# Patient Record
Sex: FEMALE | Race: WHITE | NOT HISPANIC OR LATINO | Employment: OTHER | URBAN - NONMETROPOLITAN AREA
[De-identification: names, ages, dates, MRNs, and addresses within clinical notes are randomized per-mention and may not be internally consistent; named-entity substitution may affect disease eponyms.]

---

## 2017-03-10 ENCOUNTER — TELEPHONE (OUTPATIENT)
Dept: MEDICAL GROUP | Facility: PHYSICIAN GROUP | Age: 62
End: 2017-03-10

## 2017-03-10 DIAGNOSIS — Z23 NEED FOR SHINGLES VACCINE: ICD-10-CM

## 2017-03-10 NOTE — TELEPHONE ENCOUNTER
1. Caller Name: Leola                                       Call Back Number: [phone] (home)       Patient approves a detailed voicemail message: no    Leola asking for RX Shingles Vaccine. Would like mailed to her. Address confirmed with Leola

## 2017-03-10 NOTE — TELEPHONE ENCOUNTER
Patient requesting shingles vaccine due to age and previous history of chicken Pox.  Rx will be mailed to patient's home.

## 2017-04-04 ENCOUNTER — HOSPITAL ENCOUNTER (OUTPATIENT)
Dept: LAB | Facility: MEDICAL CENTER | Age: 62
End: 2017-04-04
Attending: FAMILY MEDICINE
Payer: COMMERCIAL

## 2017-04-04 DIAGNOSIS — E78.00 PURE HYPERCHOLESTEROLEMIA: ICD-10-CM

## 2017-04-04 LAB
ALBUMIN SERPL BCP-MCNC: 3.7 G/DL (ref 3.2–4.9)
ALBUMIN/GLOB SERPL: 1 G/DL
ALP SERPL-CCNC: 75 U/L (ref 30–99)
ALT SERPL-CCNC: 10 U/L (ref 2–50)
ANION GAP SERPL CALC-SCNC: 8 MMOL/L (ref 0–11.9)
AST SERPL-CCNC: 13 U/L (ref 12–45)
BILIRUB SERPL-MCNC: 0.3 MG/DL (ref 0.1–1.5)
BUN SERPL-MCNC: 18 MG/DL (ref 8–22)
CALCIUM SERPL-MCNC: 9.2 MG/DL (ref 8.5–10.5)
CHLORIDE SERPL-SCNC: 110 MMOL/L (ref 96–112)
CHOLEST SERPL-MCNC: 172 MG/DL (ref 100–199)
CO2 SERPL-SCNC: 24 MMOL/L (ref 20–33)
CREAT SERPL-MCNC: 1.19 MG/DL (ref 0.5–1.4)
GFR SERPL CREATININE-BSD FRML MDRD: 46 ML/MIN/1.73 M 2
GLOBULIN SER CALC-MCNC: 3.8 G/DL (ref 1.9–3.5)
GLUCOSE SERPL-MCNC: 104 MG/DL (ref 65–99)
HDLC SERPL-MCNC: 45 MG/DL
LDLC SERPL CALC-MCNC: 94 MG/DL
POTASSIUM SERPL-SCNC: 4.2 MMOL/L (ref 3.6–5.5)
PROT SERPL-MCNC: 7.5 G/DL (ref 6–8.2)
SODIUM SERPL-SCNC: 142 MMOL/L (ref 135–145)
TRIGL SERPL-MCNC: 165 MG/DL (ref 0–149)

## 2017-04-04 PROCEDURE — 36415 COLL VENOUS BLD VENIPUNCTURE: CPT

## 2017-04-04 PROCEDURE — 80061 LIPID PANEL: CPT

## 2017-04-04 PROCEDURE — 80053 COMPREHEN METABOLIC PANEL: CPT

## 2017-05-12 ENCOUNTER — OFFICE VISIT (OUTPATIENT)
Dept: MEDICAL GROUP | Facility: PHYSICIAN GROUP | Age: 62
End: 2017-05-12
Payer: COMMERCIAL

## 2017-05-12 ENCOUNTER — HOSPITAL ENCOUNTER (OUTPATIENT)
Facility: MEDICAL CENTER | Age: 62
End: 2017-05-12
Attending: FAMILY MEDICINE
Payer: COMMERCIAL

## 2017-05-12 VITALS
WEIGHT: 221 LBS | DIASTOLIC BLOOD PRESSURE: 88 MMHG | TEMPERATURE: 98.7 F | OXYGEN SATURATION: 97 % | HEIGHT: 64 IN | SYSTOLIC BLOOD PRESSURE: 128 MMHG | RESPIRATION RATE: 18 BRPM | BODY MASS INDEX: 37.73 KG/M2 | HEART RATE: 74 BPM

## 2017-05-12 DIAGNOSIS — L71.9 ROSACEA, ACNE: ICD-10-CM

## 2017-05-12 DIAGNOSIS — I10 HTN, GOAL BELOW 130/80: ICD-10-CM

## 2017-05-12 DIAGNOSIS — Z23 NEED FOR SHINGLES VACCINE: ICD-10-CM

## 2017-05-12 DIAGNOSIS — E66.01 MORBID OBESITY WITH BMI OF 40.0-44.9, ADULT (HCC): ICD-10-CM

## 2017-05-12 DIAGNOSIS — Z12.31 SCREENING MAMMOGRAM, ENCOUNTER FOR: ICD-10-CM

## 2017-05-12 DIAGNOSIS — R19.5 OCCULT BLOOD IN STOOLS: ICD-10-CM

## 2017-05-12 DIAGNOSIS — R00.0 TACHYCARDIA: ICD-10-CM

## 2017-05-12 DIAGNOSIS — E78.00 PURE HYPERCHOLESTEROLEMIA: ICD-10-CM

## 2017-05-12 PROCEDURE — 99214 OFFICE O/P EST MOD 30 MIN: CPT | Performed by: FAMILY MEDICINE

## 2017-05-12 PROCEDURE — 80307 DRUG TEST PRSMV CHEM ANLYZR: CPT

## 2017-05-12 RX ORDER — PHENTERMINE HYDROCHLORIDE 15 MG/1
15 CAPSULE ORAL 2 TIMES DAILY
Qty: 60 CAP | Refills: 5 | Status: SHIPPED | OUTPATIENT
Start: 2017-05-12 | End: 2018-04-25

## 2017-05-12 RX ORDER — PHENTERMINE HYDROCHLORIDE 37.5 MG/1
37.5 TABLET ORAL
Qty: 30 TAB | Refills: 5 | Status: SHIPPED | OUTPATIENT
Start: 2017-05-12 | End: 2017-05-12

## 2017-05-12 ASSESSMENT — PATIENT HEALTH QUESTIONNAIRE - PHQ9: CLINICAL INTERPRETATION OF PHQ2 SCORE: 0

## 2017-05-12 NOTE — ASSESSMENT & PLAN NOTE
This is a chronic health problem that is well controlled with current medications and lifestyle measures.  She is doing great and her BPs are staying good. The patient denies chest pain, shortness of breath or dyspnea on exertion.

## 2017-05-12 NOTE — ASSESSMENT & PLAN NOTE
This is a chronic health problem that is well controlled with current medications and lifestyle measures.  She is doing better with all her changes in eating and losing weight.  Her total cholesterol is down to 172, triglycerides are slightly higher at 165, LDL is good at 45 and HDL is good at 94.  Will continue with lifestyle management.

## 2017-05-12 NOTE — ASSESSMENT & PLAN NOTE
This was a problem for the patient she had a colonoscopy that revealed several small polyps. She's had no further problems. We will resolve this at this time.

## 2017-05-12 NOTE — ASSESSMENT & PLAN NOTE
This is a chronic health problem that is well controlled with current medications and lifestyle measures.  She is now seeing dermatology with Dr. Sweeney who rewrote all her medications.

## 2017-05-12 NOTE — ASSESSMENT & PLAN NOTE
This is a chronic health problem that is well controlled with current medications and lifestyle measures.  With getting her BP controlled this has resolved.

## 2017-05-12 NOTE — PROGRESS NOTES
Chief Complaint   Patient presents with   • Results     labs   • Medication Refill     Phentermine       HISTORY OF PRESENT ILLNESS: Patient is a 61 y.o. female established patient who presents today to discuss her problems listed below.  This patient did their labs prior to the appointment and would like to review those results today.      Health Maintenance: needs to have her Mammogram done in [place].  Ordered it today and she will get it done in the next month    Morbid obesity with BMI of 40.0-44.9, adult  This is a chronic health problem that is uncontrolled with current medications and lifestyle measures.  Patient has been utilizing Phentermine and Her  Query report is consistent with that being the only controlled substance she has been receiving for the last 2 years.  Will do a UDS today, but expected to be negative for phentermine since she's been out of medication for nearly a month. She tells me that the current dosing of phentermine 37.5 mg extended release is not working well for her. She's wondering if she could switch to a lower dose that she could take morning and early afternoon. We will switch her over to the phentermine 15 mg capsule one upon awakening and one shortly after lunch. We'll see if she gets adequate suppression of her appetite with this. She'll continue with the Topamax 50 mg at bedtime.    HTN, goal below 130/80  This is a chronic health problem that is well controlled with current medications and lifestyle measures.  She is doing great and her BPs are staying good. The patient denies chest pain, shortness of breath or dyspnea on exertion.      Tachycardia  This is a chronic health problem that is well controlled with current medications and lifestyle measures.  With getting her BP controlled this has resolved.    Pure hypercholesterolemia  This is a chronic health problem that is well controlled with current medications and lifestyle measures.  She is doing better with all her  changes in eating and losing weight.  Her total cholesterol is down to 172, triglycerides are slightly higher at 165, LDL is good at 45 and HDL is good at 94.  Will continue with lifestyle management.    Rosacea, acne  This is a chronic health problem that is well controlled with current medications and lifestyle measures.  She is now seeing dermatology with Dr. Sweeney who rewrote all her medications.    Occult blood in stools  This was a problem for the patient she had a colonoscopy that revealed several small polyps. She's had no further problems. We will resolve this at this time.        Patient Active Problem List    Diagnosis Date Noted   • External hemorrhoid 04/17/2015   • Vitamin D deficiency 03/06/2015   • Pure hypercholesterolemia 12/12/2014   • Vaginal vault prolapse 12/12/2014   • HTN, goal below 130/80 12/12/2014   • Morbid obesity with BMI of 40.0-44.9, adult (CMS-[facility]) 09/26/2014   • Fatigue 09/26/2014   • GERD (gastroesophageal reflux disease) 09/26/2014   • SOB (shortness of breath) on exertion 09/26/2014   • Rosacea, acne 09/26/2014      Allergies:Review of patient's allergies indicates no known allergies.    Current Outpatient Prescriptions   Medication Sig Dispense Refill   • phentermine 15 MG capsule Take 1 Cap by mouth 2 Times a Day. 60 Cap 5   • zoster vaccine live, PF, (ZOSTAVAX) 81778 UNT/0.65ML injection Inject 0.65 mL as instructed Once for 1 dose. 1 Each 0   • metronidazole (METROCREAM) 0.75 % cream Apply 1 Application to affected area(s) 2 times a day. 45 g 3   • topiramate (TOPAMAX) 50 MG tablet TAKE ONE TABLET BY MOUTH AT BEDTIME 90 Tab 3   • spironolactone (ALDACTONE) 50 MG Tab TAKE ONE TABLET BY MOUTH ONCE DAILY 90 Tab 3   • metoprolol SR (TOPROL XL) 50 MG TABLET SR 24 HR TAKE ONE TABLET BY MOUTH ONCE DAILY 90 Tab 3   • omeprazole (PRILOSEC) 40 MG delayed-release capsule Take 1 Cap by mouth every day. 90 Cap 3   • nystatin (MYCOSTATIN) 850993 UNIT/GM Ointment APPLY  OINTMENT TOPICALLY TO  "AFFECTED AREA TWICE DAILY 1 Tube 0     No current facility-administered medications for this visit.       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, resides in [place], [place]       Family History   Problem Relation Age of Onset   • Cancer Mother      stomach cancer   • Cancer Father      blood cancer   • Diabetes Sister    • Hyperlipidemia Sister    • Hypertension Sister    • Heart Attack Brother    • Heart Disease Brother    • Hypertension Brother    • Heart Attack Brother    • Heart Disease Brother    • Hypertension Brother    • Arthritis Brother      knee replacement       Review of Systems:   Constitutional ROS: No unexpected change in weight, No weakness, No fatigue, No unexplained fevers, sweats, or chills  Pulmonary ROS: No chronic cough, sputum, or hemoptysis, No dyspnea on exertion, No shortness of breath, No recent change in breathing  Cardiovascular ROS: No exercise intolerance, No chest pain, No shortness of breath, No palpitations, No syncope    Exam:    Blood pressure 128/88, pulse 74, temperature 37.1 °C (98.7 °F), resp. rate 18, height 1.615 m (5' 3.6\"), weight 100.245 kg (221 lb), SpO2 97 %.  General:  Well nourished, well developed female in NAD  Head is grossly normal.  Neck: Supple without JVD or bruit. Thyroid is not enlarged.  Pulmonary: Clear to ausculation and percussion.  Normal effort. No rales, ronchi, or wheezing.  Cardiovascular: Regular rate and rhythm without murmur. Carotid and radial pulses are intact and equal bilaterally.  Extremities: no clubbing, cyanosis, or edema.  LABS: 4/4/17: Results reviewed and discussed with the patient, questions answered.    Please note that this dictation was created using voice recognition software. I have made every reasonable attempt to correct obvious errors, but I " expect that there are errors of grammar and possibly content that I did not discover before finalizing the note.    Assessment/Plan:  1. Morbid obesity with BMI of 40.0-44.9, adult (CMS-HCC)  Uncontrolled, we will do a urine drug screen today since it's been greater than a year since she had one done. She will change her dosing of phentermine to 15 mg twice a day taking one upon arising and one in early afternoon. She is warned that this could keep her awake at night. She will notify me if she's having problems with this dosing.  - PAIN MANAGEMENT SCRN, UR; Future  - phentermine 15 MG capsule; Take 1 Cap by mouth 2 Times a Day.  Dispense: 60 Cap; Refill: 5    2. HTN, goal below 130/80  Controlled, continue with current medications.    3. Tachycardia  Now resolved with treatment of her hypertension.    4. Pure hypercholesterolemia  Controlled with weight loss and lifestyle management. She will continue to work on this    5. Rosacea, acne  Controlled with current medications. She is now seeing a dermatologist who will handle this problem.     6. Screening mammogram, encounter for  Patient is overdue for her mammogram and will get this accomplished at the [facility] breast center in [facility].  - MA-SCREEN MAMMO W/CAD-BILAT

## 2017-05-12 NOTE — ASSESSMENT & PLAN NOTE
This is a chronic health problem that is uncontrolled with current medications and lifestyle measures.  Patient has been utilizing Phentermine and Her  Query report is consistent with that being the only controlled substance she has been receiving for the last 2 years.  Will do a UDS today, but expected to be negative for phentermine since she's been out of medication for nearly a month. She tells me that the current dosing of phentermine 37.5 mg extended release is not working well for her. She's wondering if she could switch to a lower dose that she could take morning and early afternoon. We will switch her over to the phentermine 15 mg capsule one upon awakening and one shortly after lunch. We'll see if she gets adequate suppression of her appetite with this. She'll continue with the Topamax 50 mg at bedtime.

## 2017-05-12 NOTE — MR AVS SNAPSHOT
"        Leola CASTRO Kurt   2017 10:20 AM   Office Visit   MRN: [id number]    Department:  [facility]   Dept Phone:  [phone]    Description:  Female : 1955   Provider:  Billie Linares M.D.           Reason for Visit     Results labs    Medication Refill Phentermine      Allergies as of 2017     No Known Allergies      You were diagnosed with     Morbid obesity with BMI of 40.0-44.9, adult (CMS-HCC)   [095206]       HTN, goal below 130/80   [414176]       Tachycardia   [643875]       Pure hypercholesterolemia   [272.0.ICD-9-CM]       Rosacea, acne   [712162]       Screening mammogram, encounter for   [3885299]       Need for shingles vaccine   [089322]         Vital Signs     Blood Pressure Pulse Temperature Respirations Height Weight    128/88 mmHg 74 37.1 °C (98.7 °F) 18 1.615 m (5' 3.6\") 100.245 kg (221 lb)    Body Mass Index Oxygen Saturation Smoking Status             38.43 kg/m2 97% Never Smoker          Basic Information     Date Of Birth Sex Race Ethnicity Preferred Language    1955 Female White Non- English      Problem List              ICD-10-CM Priority Class Noted - Resolved    Morbid obesity with BMI of 40.0-44.9, adult (CMS-HCC) E66.01, Z68.41   2014 - Present    Fatigue R53.83   2014 - Present    GERD (gastroesophageal reflux disease) K21.9   2014 - Present    SOB (shortness of breath) on exertion R06.02   2014 - Present    Rosacea, acne L71.9   2014 - Present    Pure hypercholesterolemia E78.00   2014 - Present    Vaginal vault prolapse N81.9   2014 - Present    HTN, goal below 130/80 I10   2014 - Present    Vitamin D deficiency E55.9   3/6/2015 - Present    External hemorrhoid (Chronic) K64.4   2015 - Present    Occult blood in stools R19.5   3/16/2016 - Present      Health Maintenance        Date Due Completion Dates    IMM DTaP/Tdap/Td Vaccine (1 - Tdap) 10/2/1972 ---    MAMMOGRAM 10/2/1993 ---    IMM " ZOSTER VACCINE 10/2/2013 ---    COLONOSCOPY 8/26/2018 8/26/2016            Current Immunizations     13-VALENT PCV PREVNAR 10/21/2014 10:34 AM    Influenza Vaccine Adult HD 11/2/2015    Influenza Vaccine Quad Inj (Pf) 10/21/2014 10:34 AM    Influenza Vaccine Quad Inj (Preserved) 10/21/2016 12:16 PM      Below and/or attached are the medications your provider expects you to take. Review all of your home medications and newly ordered medications with your provider and/or pharmacist. Follow medication instructions as directed by your provider and/or pharmacist. Please keep your medication list with you and share with your provider. Update the information when medications are discontinued, doses are changed, or new medications (including over-the-counter products) are added; and carry medication information at all times in the event of emergency situations     Allergies:  No Known Allergies          Medications  Valid as of: May 12, 2017 - 11:40 AM    Generic Name Brand Name Tablet Size Instructions for use    Metoprolol Succinate (TABLET SR 24 HR) TOPROL XL 50 MG TAKE ONE TABLET BY MOUTH ONCE DAILY        MetroNIDAZOLE (Cream) METROCREAM 0.75 % Apply 1 Application to affected area(s) 2 times a day.        Nystatin (Ointment) MYCOSTATIN 408231 UNIT/GM APPLY  OINTMENT TOPICALLY TO AFFECTED AREA TWICE DAILY        Omeprazole (CAPSULE DELAYED RELEASE) PRILOSEC 40 MG Take 1 Cap by mouth every day.        Phentermine HCl (Cap) phentermine 15 MG Take 1 Cap by mouth 2 Times a Day.        Spironolactone (Tab) ALDACTONE 50 MG TAKE ONE TABLET BY MOUTH ONCE DAILY        Topiramate (Tab) TOPAMAX 50 MG TAKE ONE TABLET BY MOUTH AT BEDTIME        Zoster Vaccine Live (Recon Soln) ZOSTAVAX 44131 UNT/0.65ML Inject 0.65 mL as instructed Once for 1 dose.        .                 Medicines prescribed today were sent to:     [address] - [address] [place] [address]    Phone: [phone]  Fax: [phone]    Open 24 Hours?: No      Medication refill instructions:       If your prescription bottle indicates you have medication refills left, it is not necessary to call your provider’s office. Please contact your pharmacy and they will refill your medication.    If your prescription bottle indicates you do not have any refills left, you may request refills at any time through one of the following ways: The online [employer] system (except Urgent Care), by calling your provider’s office, or by asking your pharmacy to contact your provider’s office with a refill request. Medication refills are processed only during regular business hours and may not be available until the next business day. Your provider may request additional information or to have a follow-up visit with you prior to refilling your medication.   *Please Note: Medication refills are assigned a new Rx number when refilled electronically. Your pharmacy may indicate that no refills were authorized even though a new prescription for the same medication is available at the pharmacy. Please request the medicine by name with the pharmacy before contacting your provider for a refill.        Your To Do List     Future Labs/Procedures Complete By Expires    PAIN MANAGEMENT CARISSA, UR  As directed 5/12/2018    Comments:    Current Meds (name, sig, last dose):   Current outpatient prescriptions:   •  metronidazole, 1 Application, Topical, BID, 5/12/2017  •  topiramate, TAKE ONE TABLET BY MOUTH AT BEDTIME, 5/12/2017  •  phentermine, 37.5 mg, Oral, QAM AC, 5/12/2017  •  spironolactone, TAKE ONE TABLET BY MOUTH ONCE DAILY, 5/12/2017  •  metoprolol SR, TAKE ONE TABLET BY MOUTH ONCE DAILY, 5/12/2017  •  omeprazole, 40 mg, Oral, DAILY, 5/12/2017  •  nystatin, APPLY  OINTMENT TOPICALLY TO AFFECTED AREA TWICE DAILY, 5/12/2017               [employer] Access Code: Activation code not generated  Current [employer] Status: Active

## 2017-05-16 LAB
6MAM UR QL: NOT DETECTED
7AMINOCLONAZEPAM UR QL: NOT DETECTED
A-OH ALPRAZ UR QL: NOT DETECTED
ALPRAZ UR QL: NOT DETECTED
AMPHET UR QL SCN: NOT DETECTED
ANNOTATION COMMENT IMP: NORMAL
ANNOTATION COMMENT IMP: NORMAL
BARBITURATES UR QL: NOT DETECTED
BUPRENORPHINE UR QL: NOT DETECTED
BZE UR QL: NOT DETECTED
CARBOXYTHC UR QL: NOT DETECTED
CARISOPRODOL UR QL: NOT DETECTED
CLONAZEPAM UR QL: NOT DETECTED
CODEINE UR QL: NOT DETECTED
DIAZEPAM UR QL: NOT DETECTED
ETHYL GLUCURONIDE UR QL: NOT DETECTED
FENTANYL UR QL: NOT DETECTED
HYDROCODONE UR QL: NOT DETECTED
HYDROMORPHONE UR QL: NOT DETECTED
LORAZEPAM UR QL: NOT DETECTED
MDA UR QL: NOT DETECTED
MDEA UR QL: NOT DETECTED
MDMA UR QL: NOT DETECTED
MEPERIDINE UR QL: NOT DETECTED
METHADONE UR QL: NOT DETECTED
METHAMPHET UR QL: NOT DETECTED
MIDAZOLAM UR QL SCN: NOT DETECTED
MORPHINE UR QL: NOT DETECTED
NORBUPRENORPHINE UR QL CFM: NOT DETECTED
NORDIAZEPAM UR QL: NOT DETECTED
NORFENTANYL UR QL: NOT DETECTED
NORHYDROCODONE UR QL CFM: NOT DETECTED
NOROXYCODONE UR QL CFM: NOT DETECTED
NOROXYMORPH CO100 Q0458: NOT DETECTED
OXAZEPAM UR QL: NOT DETECTED
OXYCODONE UR QL: NOT DETECTED
OXYMORPHONE UR QL: NOT DETECTED
PATHOLOGY STUDY: NORMAL
PCP UR QL: NOT DETECTED
PHENTERMINE UR QL: PRESENT
PPAA UR QL: NOT DETECTED
PROPOXYPH UR QL: NOT DETECTED
SERVICE CMNT-IMP: NORMAL
TAPENADOL OSULF CO200 Q0473: NOT DETECTED
TAPENTADOL UR QL SCN: NOT DETECTED
TEMAZEPAM UR QL: NOT DETECTED
TRAMADOL UR QL: NOT DETECTED
ZOLPIDEM UR QL: NOT DETECTED

## 2017-05-31 ENCOUNTER — TELEPHONE (OUTPATIENT)
Dept: MEDICAL GROUP | Facility: PHYSICIAN GROUP | Age: 62
End: 2017-05-31

## 2017-05-31 DIAGNOSIS — E66.01 MORBID OBESITY DUE TO EXCESS CALORIES (HCC): ICD-10-CM

## 2017-05-31 NOTE — TELEPHONE ENCOUNTER
Pt called and was asking for her thyroid panel to be ordered.  She was seen with Dr. FRANCOIS on 5.12.17.  Please order.  She said thank you.

## 2017-06-16 ENCOUNTER — HOSPITAL ENCOUNTER (OUTPATIENT)
Dept: LAB | Facility: MEDICAL CENTER | Age: 62
End: 2017-06-16
Attending: FAMILY MEDICINE
Payer: COMMERCIAL

## 2017-06-16 DIAGNOSIS — E66.01 MORBID OBESITY DUE TO EXCESS CALORIES (HCC): ICD-10-CM

## 2017-06-16 LAB — TSH SERPL DL<=0.005 MIU/L-ACNC: 1.48 UIU/ML (ref 0.3–3.7)

## 2017-06-16 PROCEDURE — 84443 ASSAY THYROID STIM HORMONE: CPT

## 2017-06-16 PROCEDURE — 36415 COLL VENOUS BLD VENIPUNCTURE: CPT

## 2017-11-03 DIAGNOSIS — I10 HTN, GOAL BELOW 130/80: ICD-10-CM

## 2017-11-03 DIAGNOSIS — E66.01 MORBID OBESITY WITH BMI OF 40.0-44.9, ADULT (HCC): ICD-10-CM

## 2017-11-03 RX ORDER — TOPIRAMATE 50 MG/1
TABLET, FILM COATED ORAL
Qty: 90 TAB | Refills: 3 | Status: SHIPPED | OUTPATIENT
Start: 2017-11-03 | End: 2018-04-25 | Stop reason: SDUPTHER

## 2017-11-03 RX ORDER — SPIRONOLACTONE 50 MG/1
TABLET, FILM COATED ORAL
Qty: 90 TAB | Refills: 3 | Status: CANCELLED | OUTPATIENT
Start: 2017-11-03

## 2017-11-03 NOTE — TELEPHONE ENCOUNTER
Was the patient seen in the last year in this department? Yes     Does patient have an active prescription for medications requested? No     Received Request Via: Pharmacy        LAST SEEN 05/12/17  LABS 04/04/2017

## 2018-01-15 DIAGNOSIS — E66.01 MORBID OBESITY WITH BMI OF 40.0-44.9, ADULT (HCC): ICD-10-CM

## 2018-01-22 RX ORDER — PHENTERMINE HYDROCHLORIDE 15 MG/1
15 CAPSULE ORAL 2 TIMES DAILY
Qty: 60 CAP | Refills: 5 | OUTPATIENT
Start: 2018-01-22

## 2018-03-23 ENCOUNTER — APPOINTMENT (OUTPATIENT)
Dept: MEDICAL GROUP | Facility: MEDICAL CENTER | Age: 63
End: 2018-03-23
Payer: COMMERCIAL

## 2018-04-24 ENCOUNTER — TELEPHONE (OUTPATIENT)
Dept: MEDICAL GROUP | Facility: MEDICAL CENTER | Age: 63
End: 2018-04-24

## 2018-04-24 NOTE — TELEPHONE ENCOUNTER
ESTABLISHED PATIENT PRE-VISIT PLANNING     Note: Patient will not be contacted if there is no indication to call.     1.  Reviewed notes from the last few office visits within the medical group: Yes    2.  If any orders were placed at last visit or intended to be done for this visit (i.e. 6 mos follow-up), do we have Results/Consult Notes?        •  Labs - Labs ordered, completed on 05/12/2017 and results are in chart.   Note: If patient appointment is for lab review and patient did not complete labs, check with provider if OK to reschedule patient until labs completed.       •  Imaging - scheduled mammo        •  Referrals - No referrals were ordered at last office visit.    3. Is this appointment scheduled as a Hospital Follow-Up? No    4.  Immunizations were updated in Epic using WebIZ?: No WebIZ record       •  Web Iz Recommendations:     5.  Patient is due for the following Health Maintenance Topics:   Health Maintenance Due   Topic Date Due   • IMM DTaP/Tdap/Td Vaccine (1 - Tdap) 10/02/1972   • MAMMOGRAM  10/02/1993         6.  MDX printed for Provider? NO    7.  Patient was NOT informed to arrive 15 min prior to their scheduled appointment and bring in their medication bottles.

## 2018-04-25 ENCOUNTER — OFFICE VISIT (OUTPATIENT)
Dept: MEDICAL GROUP | Facility: MEDICAL CENTER | Age: 63
End: 2018-04-25
Payer: COMMERCIAL

## 2018-04-25 VITALS
HEIGHT: 60 IN | HEART RATE: 78 BPM | OXYGEN SATURATION: 98 % | TEMPERATURE: 98.2 F | SYSTOLIC BLOOD PRESSURE: 126 MMHG | BODY MASS INDEX: 48.04 KG/M2 | DIASTOLIC BLOOD PRESSURE: 78 MMHG | WEIGHT: 244.71 LBS | RESPIRATION RATE: 14 BRPM

## 2018-04-25 DIAGNOSIS — L71.9 ROSACEA, ACNE: ICD-10-CM

## 2018-04-25 DIAGNOSIS — K21.9 GASTROESOPHAGEAL REFLUX DISEASE WITHOUT ESOPHAGITIS: ICD-10-CM

## 2018-04-25 DIAGNOSIS — E78.1 PURE HYPERGLYCERIDEMIA: ICD-10-CM

## 2018-04-25 DIAGNOSIS — E66.01 MORBID OBESITY WITH BMI OF 45.0-49.9, ADULT (HCC): ICD-10-CM

## 2018-04-25 DIAGNOSIS — I10 HTN, GOAL BELOW 130/80: ICD-10-CM

## 2018-04-25 PROCEDURE — 99214 OFFICE O/P EST MOD 30 MIN: CPT | Performed by: FAMILY MEDICINE

## 2018-04-25 RX ORDER — PHENTERMINE HYDROCHLORIDE 37.5 MG/1
37.5 TABLET ORAL
Qty: 90 TAB | Refills: 0 | Status: SHIPPED | OUTPATIENT
Start: 2018-04-25 | End: 2018-07-18 | Stop reason: SDUPTHER

## 2018-04-25 RX ORDER — SPIRONOLACTONE 50 MG/1
TABLET, FILM COATED ORAL
Qty: 90 TAB | Refills: 3 | Status: SHIPPED | OUTPATIENT
Start: 2018-04-25 | End: 2018-07-09 | Stop reason: SDUPTHER

## 2018-04-25 RX ORDER — METOPROLOL SUCCINATE 50 MG/1
TABLET, EXTENDED RELEASE ORAL
Qty: 90 TAB | Refills: 3 | Status: SHIPPED | OUTPATIENT
Start: 2018-04-25 | End: 2018-07-09 | Stop reason: SDUPTHER

## 2018-04-25 RX ORDER — OMEPRAZOLE 40 MG/1
CAPSULE, DELAYED RELEASE ORAL
Qty: 90 CAP | Refills: 3 | Status: SHIPPED | OUTPATIENT
Start: 2018-04-25 | End: 2018-07-09 | Stop reason: SDUPTHER

## 2018-04-25 RX ORDER — TOPIRAMATE 50 MG/1
TABLET, FILM COATED ORAL
Qty: 90 TAB | Refills: 3 | Status: SHIPPED | OUTPATIENT
Start: 2018-04-25 | End: 2018-07-09 | Stop reason: SDUPTHER

## 2018-04-25 RX ORDER — NYSTATIN 100000 U/G
OINTMENT TOPICAL
Qty: 1 TUBE | Refills: 3 | Status: SHIPPED | OUTPATIENT
Start: 2018-04-25 | End: 2018-07-09 | Stop reason: SDUPTHER

## 2018-04-25 ASSESSMENT — PATIENT HEALTH QUESTIONNAIRE - PHQ9: CLINICAL INTERPRETATION OF PHQ2 SCORE: 0

## 2018-04-25 NOTE — PROGRESS NOTES
CC:Diagnoses of Morbid obesity with BMI of 45.0-49.9, adult (CMS-[facility]), HTN, goal below 130/80, Pure hyperglyceridemia, Rosacea, acne, Gastroesophageal reflux disease without esophagitis, and Morbid obesity with BMI of 40.0-44.9, adult (CMS-Formerly [facility]) were pertinent to this visit.    HISTORY OF PRESENT ILLNESS: Patient is a 62 y.o. female established patient who presents today to follow-up on her chronic health problems as outlined below. Patient is quite frustrated with her weight gain. She had been doing very well with weight loss while she was on the phentermine and then ran out of medication about 7 months ago and thought that she could keep her weight under control without it. Problem is this patient has a fairly stressful life living with a son who is just released from [facility] and trying to get cured of hepatitis C, as well as trying to save her forearm from financial ruin after 's death 5 years ago. Patient states that she is doing fairly well at this point and would like to go back on the phentermine. She is willing to come in every 90 days for appointments. Patient is overdue for mammogram but does not wish to proceed with that at this time.    Health Maintenance: Completed    Morbid obesity with BMI of 45.0-49.9, adult (CMS-[facility])  This is a chronic health problem that is uncontrolled with current medications and lifestyle measures. She has been without her phentermine for over 7 months and notes that she's gained weight without it. She found that when she was taking that it helps her to lose weight and to keep her appetite suppressants. She would like to go back on the medication. Only last saw her in May 2017 she got down to 221 pounds and she is now up to 244. She's had no side effects from the medication. She takes her prescription as prescribed. We will write her for a 90 day supply and then see her back.    Obtained and reviewed patient utilization report from [facility] pharmacy database on  4/25/2018 2:43 PM  prior to writing prescription for controlled substance II, III or IV per [place] bill . Based on assessment of the report, the prescription is medically necessary.       HTN, goal below 130/80  This is a chronic health problem that is well controlled with current medications and lifestyle measures.   The patient denies chest pain, shortness of breath or dyspnea on exertion. Blood pressure is excellent today 126/78. We will have her continue with current meds.      Pure hyperglyceridemia  Physical chronic health problem for this patient for which she has not been on medication. The last lab work was done on her about a year ago and her triglycerides were elevated at 165. She is getting ready to go back on phentermine and work on weight loss. We will wait 3 months to recheck her lipid panel because with the weight loss that should improve.    Rosacea, acne  This is a chronic health problem that is well controlled with current medications and lifestyle measures.  She needs to be back on her metrocream.      Patient Active Problem List    Diagnosis Date Noted   • External hemorrhoid 04/17/2015   • Vitamin D deficiency 03/06/2015   • Pure hyperglyceridemia 12/12/2014   • Vaginal vault prolapse 12/12/2014   • HTN, goal below 130/80 12/12/2014   • Morbid obesity with BMI of 45.0-49.9, adult (CMS-[facility]) 09/26/2014   • Fatigue 09/26/2014   • GERD (gastroesophageal reflux disease) 09/26/2014   • SOB (shortness of breath) on exertion 09/26/2014   • Rosacea, acne 09/26/2014      Allergies:Patient has no known allergies.    Current Outpatient Prescriptions   Medication Sig Dispense Refill   • metoprolol SR (TOPROL XL) 50 MG TABLET SR 24 HR TAKE ONE TABLET BY MOUTH ONCE DAILY 90 Tab 3   • omeprazole (PRILOSEC) 40 MG delayed-release capsule TAKE ONE CAPSULE BY MOUTH ONCE DAILY 90 Cap 3   • spironolactone (ALDACTONE) 50 MG Tab TAKE ONE TABLET BY MOUTH ONCE DAILY 90 Tab 3   • topiramate (TOPAMAX) 50 MG tablet  TAKE ONE TABLET BY MOUTH AT BEDTIME 90 Tab 3   • metronidazole (METROCREAM) 0.75 % cream Apply 1 Application to affected area(s) 2 times a day. 45 g 3   • nystatin (MYCOSTATIN) 101390 UNIT/GM Ointment APPLY  OINTMENT TOPICALLY TO AFFECTED AREA TWICE DAILY 1 Tube 3   • phentermine (ADIPEX-P) 37.5 MG tablet Take 1 Tab by mouth every morning before breakfast for 90 days. 90 Tab 0     No current facility-administered medications for this visit.        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    - HEENT:  Negative for headaches, vision changes, hearing changes, ear pain, ear discharge, rhinorrhea, sinus congestion, sore throat, and neck pain.      - Respiratory: Negative for cough, sputum production, chest congestion, dyspnea, wheezing, and crackles.      - Cardiovascular: Negative for chest pain, palpitations, orthopnea, and bilateral lower extremity edema.     - Gastrointestinal: Negative for heartburn, nausea, vomiting, abdominal pain, hematochezia, melena, diarrhea, constipation, and greasy/foul-smelling stools.     - Genitourinary: Negative for dysuria, polyuria, hematuria, pyuria, urinary urgency, and urinary incontinence.     - Musculoskeletal*Positive for bilateral knee pain that is  "bone-on-bone, but also having right low back and groin pain which is most likely sciatica. Patient's going to see the chiropractor before she does a workup.     - Skin: Negative for rash, itching, cyanotic skin color change.     - Neurological: Negative for dizziness, tingling, tremors, focal sensory deficit, focal weakness and headaches.     - Endo/Heme/Allergies: Does not bruise/bleed easily.     - Psychiatric/Behavioral: Negative for depression, suicidal/homicidal ideation and memory loss.          - NOTE: All other systems reviewed and are negative, except as in HPI.      Exam:    Blood pressure 126/78, pulse 78, temperature 36.8 °C (98.2 °F), resp. rate 14, height 1.533 m (5' 0.36\"), weight 111 kg (244 lb 11.4 oz), SpO2 98 %, not currently breastfeeding. Body mass index is 47.22 kg/m².    General:  Well nourished, well developed female in NAD  Head is grossly normal.  Neck: Supple without JVD or bruit. Thyroid is not enlarged.  Pulmonary: Clear to ausculation and percussion.  Normal effort. No rales, ronchi, or wheezing.  Cardiovascular: Regular rate and rhythm without murmur. Carotid and radial pulses are intact and equal bilaterally.  Extremities: no clubbing, cyanosis, or edema.  Skin: Patient has her rosacea back and it is worse because she is out of her MetroCream.    Please note that this dictation was created using voice recognition software. I have made every reasonable attempt to correct obvious errors, but I expect that there are errors of grammar and possibly content that I did not discover before finalizing the note.    Assessment/Plan:  1. Morbid obesity with BMI of 45.0-49.9, adult (CMS-HCC)  Uncontrolled, patient does well on appear made and phentermine. We will restart that and see her back in 90 days.  - phentermine (ADIPEX-P) 37.5 MG tablet; Take 1 Tab by mouth every morning before breakfast for 90 days.  Dispense: 90 Tab; Refill: 0  - topiramate (TOPAMAX) 50 MG tablet; TAKE ONE TABLET BY " MOUTH AT BEDTIME  Dispense: 90 Tab; Refill: 3  2. HTN, goal below 130/80  Controlled, continue with current meds and lifestyle.    - metoprolol SR (TOPROL XL) 50 MG TABLET SR 24 HR; TAKE ONE TABLET BY MOUTH ONCE DAILY  Dispense: 90 Tab; Refill: 3  - spironolactone (ALDACTONE) 50 MG Tab; TAKE ONE TABLET BY MOUTH ONCE DAILY  Dispense: 90 Tab; Refill: 3    3. Pure hyperglyceridemia  Uncontrolled but patient has gained weight. She is going to work on weight loss for the coming 3 months and we will check her triglycerides at that time  - COMP METABOLIC PANEL; Future  - LIPID PROFILE; Future    4. Rosacea, acne  Uncontrolled because patient ran out of her MetroCream. We will send a new prescription.  - metronidazole (METROCREAM) 0.75 % cream; Apply 1 Application to affected area(s) 2 times a day.  Dispense: 45 g; Refill: 3    5. Gastroesophageal reflux disease without esophagitis  Uncontrolled patient is out of her omeprazole. We will send a new prescription  - omeprazole (PRILOSEC) 40 MG delayed-release capsule; TAKE ONE CAPSULE BY MOUTH ONCE DAILY  Dispense: 90 Cap; Refill: 3

## 2018-04-25 NOTE — ASSESSMENT & PLAN NOTE
This is a chronic health problem that is well controlled with current medications and lifestyle measures.   The patient denies chest pain, shortness of breath or dyspnea on exertion. Blood pressure is excellent today 126/78. We will have her continue with current meds.

## 2018-04-25 NOTE — ASSESSMENT & PLAN NOTE
This is a chronic health problem that is well controlled with current medications and lifestyle measures.  She needs to be back on her metrocream.

## 2018-04-25 NOTE — ASSESSMENT & PLAN NOTE
Physical chronic health problem for this patient for which she has not been on medication. The last lab work was done on her about a year ago and her triglycerides were elevated at 165. She is getting ready to go back on phentermine and work on weight loss. We will wait 3 months to recheck her lipid panel because with the weight loss that should improve.

## 2018-04-25 NOTE — ASSESSMENT & PLAN NOTE
This is a chronic health problem that is uncontrolled with current medications and lifestyle measures. She has been without her phentermine for over 7 months and notes that she's gained weight without it. She found that when she was taking that it helps her to lose weight and to keep her appetite suppressants. She would like to go back on the medication. Only last saw her in May 2017 she got down to 221 pounds and she is now up to 244. She's had no side effects from the medication. She takes her prescription as prescribed. We will write her for a 90 day supply and then see her back.    Obtained and reviewed patient utilization report from [facility] pharmacy database on 4/25/2018 2:43 PM  prior to writing prescription for controlled substance II, III or IV per [place] bill . Based on assessment of the report, the prescription is medically necessary.

## 2018-07-09 DIAGNOSIS — E66.01 MORBID OBESITY WITH BMI OF 45.0-49.9, ADULT (HCC): ICD-10-CM

## 2018-07-09 DIAGNOSIS — I10 HTN, GOAL BELOW 130/80: ICD-10-CM

## 2018-07-09 DIAGNOSIS — K21.9 GASTROESOPHAGEAL REFLUX DISEASE WITHOUT ESOPHAGITIS: ICD-10-CM

## 2018-07-09 RX ORDER — SPIRONOLACTONE 50 MG/1
TABLET, FILM COATED ORAL
Qty: 90 TAB | Refills: 4 | Status: SHIPPED | OUTPATIENT
Start: 2018-07-09 | End: 2019-01-01 | Stop reason: SDUPTHER

## 2018-07-09 RX ORDER — OMEPRAZOLE 40 MG/1
CAPSULE, DELAYED RELEASE ORAL
Qty: 90 CAP | Refills: 4 | Status: SHIPPED | OUTPATIENT
Start: 2018-07-09 | End: 2019-01-01 | Stop reason: SDUPTHER

## 2018-07-09 RX ORDER — TOPIRAMATE 50 MG/1
TABLET, FILM COATED ORAL
Qty: 90 TAB | Refills: 4 | Status: SHIPPED | OUTPATIENT
Start: 2018-07-09 | End: 2019-01-08 | Stop reason: SDUPTHER

## 2018-07-09 RX ORDER — METOPROLOL SUCCINATE 50 MG/1
TABLET, EXTENDED RELEASE ORAL
Qty: 90 TAB | Refills: 4 | Status: SHIPPED | OUTPATIENT
Start: 2018-07-09 | End: 2019-01-01 | Stop reason: SDUPTHER

## 2018-07-09 RX ORDER — NYSTATIN 100000 U/G
OINTMENT TOPICAL
Qty: 1 TUBE | Refills: 3 | Status: SHIPPED | OUTPATIENT
Start: 2018-07-09 | End: 2019-01-01 | Stop reason: SDUPTHER

## 2018-07-09 NOTE — TELEPHONE ENCOUNTER
Was the patient seen in the last year in this department? Yes     Does patient have an active prescription for medications requested? No     Received Request Via: Pharmacy 90 DAY Supply

## 2018-07-11 ENCOUNTER — HOSPITAL ENCOUNTER (OUTPATIENT)
Dept: LAB | Facility: MEDICAL CENTER | Age: 63
End: 2018-07-11
Attending: FAMILY MEDICINE
Payer: COMMERCIAL

## 2018-07-11 ENCOUNTER — TELEPHONE (OUTPATIENT)
Dept: MEDICAL GROUP | Facility: MEDICAL CENTER | Age: 63
End: 2018-07-11

## 2018-07-11 DIAGNOSIS — E78.1 PURE HYPERGLYCERIDEMIA: ICD-10-CM

## 2018-07-11 LAB
ALBUMIN SERPL BCP-MCNC: 4 G/DL (ref 3.2–4.9)
ALBUMIN/GLOB SERPL: 1.3 G/DL
ALP SERPL-CCNC: 66 U/L (ref 30–99)
ALT SERPL-CCNC: 10 U/L (ref 2–50)
ANION GAP SERPL CALC-SCNC: 11 MMOL/L (ref 0–11.9)
AST SERPL-CCNC: 12 U/L (ref 12–45)
BILIRUB SERPL-MCNC: 0.5 MG/DL (ref 0.1–1.5)
BUN SERPL-MCNC: 18 MG/DL (ref 8–22)
CALCIUM SERPL-MCNC: 9.3 MG/DL (ref 8.5–10.5)
CHLORIDE SERPL-SCNC: 110 MMOL/L (ref 96–112)
CHOLEST SERPL-MCNC: 166 MG/DL (ref 100–199)
CO2 SERPL-SCNC: 20 MMOL/L (ref 20–33)
CREAT SERPL-MCNC: 1.12 MG/DL (ref 0.5–1.4)
GLOBULIN SER CALC-MCNC: 3 G/DL (ref 1.9–3.5)
GLUCOSE SERPL-MCNC: 109 MG/DL (ref 65–99)
HDLC SERPL-MCNC: 50 MG/DL
LDLC SERPL CALC-MCNC: 92 MG/DL
POTASSIUM SERPL-SCNC: 4.2 MMOL/L (ref 3.6–5.5)
PROT SERPL-MCNC: 7 G/DL (ref 6–8.2)
SODIUM SERPL-SCNC: 141 MMOL/L (ref 135–145)
TRIGL SERPL-MCNC: 120 MG/DL (ref 0–149)

## 2018-07-11 PROCEDURE — 80053 COMPREHEN METABOLIC PANEL: CPT

## 2018-07-11 PROCEDURE — 80061 LIPID PANEL: CPT

## 2018-07-11 PROCEDURE — 36415 COLL VENOUS BLD VENIPUNCTURE: CPT

## 2018-07-11 NOTE — TELEPHONE ENCOUNTER
ESTABLISHED PATIENT PRE-VISIT PLANNING     Note: Patient will not be contacted if there is no indication to call.     1.  Reviewed notes from the last few office visits within the medical group: Yes 04/25/2018    2.  If any orders were placed at last visit or intended to be done for this visit (i.e. 6 mos follow-up), do we have Results/Consult Notes?        •  Labs - Labs ordered, NOT completed. Patient advised to complete prior to next appointment.   Note: If patient appointment is for lab review and patient did not complete labs, check with provider if OK to reschedule patient until labs completed.       •  Imaging - Mammo ordered.         3. Is this appointment scheduled as a Hospital Follow-Up? No    4.  Immunizations were updated in [employer] using Web[employer]?: Yes       •  Web [employer] Recommendations: TDAP    5.  Patient is due for the following Health Maintenance Topics:   Health Maintenance Due   Topic Date Due   • IMM DTaP/Tdap/Td Vaccine (1 - Tdap) 10/02/1972   • MAMMOGRAM /ORDERED  10/02/1993           6.  MDX printed for Provider? NO    7.  Patient was informed to arrive 15 min prior to their scheduled appointment and bring in their medication bottles.    *PT REMINDED TO GET LABS DONE

## 2018-07-18 ENCOUNTER — OFFICE VISIT (OUTPATIENT)
Dept: MEDICAL GROUP | Facility: MEDICAL CENTER | Age: 63
End: 2018-07-18
Payer: COMMERCIAL

## 2018-07-18 VITALS
BODY MASS INDEX: 47.57 KG/M2 | WEIGHT: 242.29 LBS | TEMPERATURE: 98.8 F | HEART RATE: 88 BPM | HEIGHT: 60 IN | SYSTOLIC BLOOD PRESSURE: 118 MMHG | OXYGEN SATURATION: 100 % | DIASTOLIC BLOOD PRESSURE: 82 MMHG

## 2018-07-18 DIAGNOSIS — E66.01 MORBID OBESITY WITH BMI OF 45.0-49.9, ADULT (HCC): ICD-10-CM

## 2018-07-18 DIAGNOSIS — I10 HTN, GOAL BELOW 130/80: ICD-10-CM

## 2018-07-18 DIAGNOSIS — R73.03 PREDIABETES: ICD-10-CM

## 2018-07-18 DIAGNOSIS — E78.1 PURE HYPERGLYCERIDEMIA: ICD-10-CM

## 2018-07-18 PROCEDURE — 99214 OFFICE O/P EST MOD 30 MIN: CPT | Performed by: FAMILY MEDICINE

## 2018-07-18 RX ORDER — PHENTERMINE HYDROCHLORIDE 37.5 MG/1
37.5 TABLET ORAL
Qty: 90 TAB | Refills: 0 | Status: SHIPPED | OUTPATIENT
Start: 2018-07-18 | End: 2018-10-10 | Stop reason: SDUPTHER

## 2018-07-18 RX ORDER — SPIRONOLACTONE 25 MG/1
TABLET ORAL
COMMUNITY
Start: 2018-05-05 | End: 2019-01-01

## 2018-07-18 NOTE — PROGRESS NOTES
CC:Diagnoses of Morbid obesity with BMI of 45.0-49.9, adult (HCC), HTN, goal below 130/80, Pure hyperglyceridemia, and Prediabetes were pertinent to this visit.      HISTORY OF PRESENT ILLNESS: Patient is a 62 y.o. female established patient who presents today to follow-up on her chronic health problems as outlined below.  Patient did do her blood work prior to the visit.    Health Maintenance: Completed    Morbid obesity with BMI of 45.0-49.9, adult (HCC)  This is a chronic health problem for this patient for which she utilizes phentermine taking it on a daily basis.  Unfortunately she has been without it for a while.  Patient had been given a prescription for 90 day supply.  She did not check to make certain that she got a 90 day supply.  She should have not run out until 7/24/18.  And she is already out of medication and is only 7/18/18.  I will write a new prescription for her today but she will actually count the tablets when she picks it up to make sure they give her 90 tablets and then we will plan to see her back within that 90 day period, also told the patient she is only lost 2 pounds in the last 90 days.  She believes some of that has to do with being out of the medication and having done some traveling.  She needs to be more consistent in her eating patterns and we need to see better weight loss or were not going to be able to continue the medication    Obtained and reviewed patient utilization report from [facility] pharmacy database on 7/18/2018 1:29 PM  prior to writing prescription for controlled substance II, III or IV per [place] bill . Based on assessment of the report,my physical exam if necessary and the patient's health problem, the prescription is medically necessary.       HTN, goal below 130/80  This is a chronic health problem for this patient that is adequately controlled with current meds.  Blood pressure today is 118/82.    Pure hyperglyceridemia  This is a chronic health problem  for this patient for which she is working on lifestyle management.  Her total cholesterol is down to 166, triglycerides 120, HDL 50 and LDL is 92.  All of this is at normal levels.  We will have her continue to work on weight loss and healthy eating.    Prediabetes  This is a new problem for this patient found on blood work.  Her blood sugar is elevated at 109, diabetes would be at a blood sugar of 126 or higher.  Patient working on weight loss.  We will renew her phentermine and hope that we can get this under control.      Patient Active Problem List    Diagnosis Date Noted   • Prediabetes 07/18/2018   • External hemorrhoid 04/17/2015   • Vitamin D deficiency 03/06/2015   • Pure hyperglyceridemia 12/12/2014   • Vaginal vault prolapse 12/12/2014   • HTN, goal below 130/80 12/12/2014   • Morbid obesity with BMI of 45.0-49.9, adult ([facility]) 09/26/2014   • Fatigue 09/26/2014   • GERD (gastroesophageal reflux disease) 09/26/2014   • SOB (shortness of breath) on exertion 09/26/2014   • Rosacea, acne 09/26/2014      Allergies:Patient has no known allergies.    Current Outpatient Prescriptions   Medication Sig Dispense Refill   • phentermine (ADIPEX-P) 37.5 MG tablet Take 1 Tab by mouth every morning before breakfast for 90 days. 90 Tab 0   • metoprolol SR (TOPROL XL) 50 MG TABLET SR 24 HR TAKE ONE TABLET BY MOUTH ONCE DAILY 90 Tab 4   • omeprazole (PRILOSEC) 40 MG delayed-release capsule TAKE ONE CAPSULE BY MOUTH ONCE DAILY 90 Cap 4   • spironolactone (ALDACTONE) 50 MG Tab TAKE ONE TABLET BY MOUTH ONCE DAILY 90 Tab 4   • topiramate (TOPAMAX) 50 MG tablet TAKE ONE TABLET BY MOUTH AT BEDTIME 90 Tab 4   • nystatin (MYCOSTATIN) 890744 UNIT/GM Ointment APPLY  OINTMENT TOPICALLY TO AFFECTED AREA TWICE DAILY 1 Tube 3   • metronidazole (METROCREAM) 0.75 % cream Apply 1 Application to affected area(s) 2 times a day. 45 g 3   • spironolactone (ALDACTONE) 25 MG Tab        No current facility-administered medications for this visit.         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     - HEENT: Negative for headaches, vision changes, hearing changes, ear pain, ear discharge, rhinorrhea, sinus congestion, sore throat, and neck pain.      - Respiratory: Negative for cough, sputum production, chest congestion, dyspnea, wheezing, and crackles.      - Cardiovascular: Negative for chest pain, palpitations, orthopnea, and bilateral lower extremity edema.     - Gastrointestinal: Negative for heartburn, nausea, vomiting, abdominal pain, hematochezia, melena, diarrhea, constipation, and greasy/foul-smelling stools.     - Genitourinary: Negative for dysuria, polyuria, hematuria, pyuria, urinary urgency, and urinary incontinence.    - Musculoskeletal: Positive for bilateral knee pain left greater than right.  Plans to see Dr. Sawyer on 7/30/18 otherwise denies myalgias, back pain, and joint pain.     - Skin: Negative for rash, itching, cyanotic skin color change.     - Neurological: Negative for dizziness, tingling, tremors, focal sensory deficit, focal weakness and headaches.     - Endo/Heme/Allergies: Does not bruise/bleed easily.     - Psychiatric/Behavioral: Negative for depression, suicidal/homicidal ideation and memory  "loss.          - NOTE: All other systems reviewed and are negative, except as in HPI.    Exam:    Blood pressure 118/82, pulse 88, temperature 37.1 °C (98.8 °F), height 1.524 m (5'), weight 109.9 kg (242 lb 4.6 oz), SpO2 100 %. Body mass index is 47.32 kg/m².    General:  Well nourished, well developed female in NAD  LABS: 7/11/18: Results reviewed and discussed with the patient, questions answered.    Patient was seen for 25 minutes face to face of which, 20 minutes was spent counseling regarding the above mentioned problems.  Assessment/Plan:  1. Morbid obesity with BMI of 45.0-49.9, adult (HCC)  Uncontrolled, patient and I discussed the fact that she should have had enough phentermine to get through 90 days.  She did not count her tablets upon receipt from the pharmacy.  She will do that with the prescription we have written today.  If she receives 90 tablets she will have an adequate supply until we see her back.  She needs to make certain that she is locking these up and protecting these for her use only.  She has 2 other adults living in her home who possibly have accessed her medication.  - phentermine (ADIPEX-P) 37.5 MG tablet; Take 1 Tab by mouth every morning before breakfast for 90 days.  Dispense: 90 Tab; Refill: 0    2. HTN, goal below 130/80  Controlled, continue with current meds and lifestyle.      3. Pure hyperglyceridemia  Controlled, continue with current meds and lifestyle.      4. Prediabetes  Uncontrolled, patient has to work on weight loss in an effort to get her prediabetes under control and not go on to develop diabetes.  Patient states that she has been eating very poorly because she uses that as an escape from living with her adult children who \"drive her nuts\".  Patient will make every effort to eat more healthy well-rounded diet.  She knows what she needs to do she just needs to do it.        "

## 2018-07-18 NOTE — ASSESSMENT & PLAN NOTE
This is a chronic health problem for this patient for which she utilizes phentermine taking it on a daily basis.  Unfortunately she has been without it for a while.  Patient had been given a prescription for 90 day supply.  She did not check to make certain that she got a 90 day supply.  She should have not run out until 7/24/18.  And she is already out of medication and is only 7/18/18.  I will write a new prescription for her today but she will actually count the tablets when she picks it up to make sure they give her 90 tablets and then we will plan to see her back within that 90 day period, also told the patient she is only lost 2 pounds in the last 90 days.  She believes some of that has to do with being out of the medication and having done some traveling.  She needs to be more consistent in her eating patterns and we need to see better weight loss or were not going to be able to continue the medication    Obtained and reviewed patient utilization report from [facility] pharmacy database on 7/18/2018 1:29 PM  prior to writing prescription for controlled substance II, III or IV per [place] bill . Based on assessment of the report,my physical exam if necessary and the patient's health problem, the prescription is medically necessary.

## 2018-07-18 NOTE — ASSESSMENT & PLAN NOTE
This is a chronic health problem for this patient for which she is working on lifestyle management.  Her total cholesterol is down to 166, triglycerides 120, HDL 50 and LDL is 92.  All of this is at normal levels.  We will have her continue to work on weight loss and healthy eating.

## 2018-07-18 NOTE — ASSESSMENT & PLAN NOTE
This is a new problem for this patient found on blood work.  Her blood sugar is elevated at 109, diabetes would be at a blood sugar of 126 or higher.  Patient working on weight loss.  We will renew her phentermine and hope that we can get this under control.

## 2018-07-18 NOTE — ASSESSMENT & PLAN NOTE
This is a chronic health problem for this patient that is adequately controlled with current meds.  Blood pressure today is 118/82.

## 2018-07-30 ENCOUNTER — APPOINTMENT (OUTPATIENT)
Dept: RADIOLOGY | Facility: MEDICAL CENTER | Age: 63
End: 2018-07-30
Attending: FAMILY MEDICINE
Payer: COMMERCIAL

## 2018-08-01 ENCOUNTER — HOSPITAL ENCOUNTER (OUTPATIENT)
Dept: RADIOLOGY | Facility: MEDICAL CENTER | Age: 63
End: 2018-08-01
Attending: FAMILY MEDICINE
Payer: COMMERCIAL

## 2018-08-01 DIAGNOSIS — Z12.31 SCREENING MAMMOGRAM, ENCOUNTER FOR: ICD-10-CM

## 2018-08-01 PROCEDURE — 77067 SCR MAMMO BI INCL CAD: CPT

## 2018-10-10 ENCOUNTER — OFFICE VISIT (OUTPATIENT)
Dept: MEDICAL GROUP | Facility: MEDICAL CENTER | Age: 63
End: 2018-10-10
Payer: MEDICARE

## 2018-10-10 VITALS
WEIGHT: 239 LBS | OXYGEN SATURATION: 97 % | HEIGHT: 60 IN | DIASTOLIC BLOOD PRESSURE: 76 MMHG | BODY MASS INDEX: 46.92 KG/M2 | RESPIRATION RATE: 12 BRPM | HEART RATE: 79 BPM | SYSTOLIC BLOOD PRESSURE: 120 MMHG | TEMPERATURE: 97.1 F

## 2018-10-10 DIAGNOSIS — F32.1 CURRENT MODERATE EPISODE OF MAJOR DEPRESSIVE DISORDER WITHOUT PRIOR EPISODE (HCC): ICD-10-CM

## 2018-10-10 DIAGNOSIS — E66.01 MORBID OBESITY WITH BMI OF 45.0-49.9, ADULT (HCC): ICD-10-CM

## 2018-10-10 DIAGNOSIS — Z23 NEEDS FLU SHOT: ICD-10-CM

## 2018-10-10 DIAGNOSIS — I10 HTN, GOAL BELOW 130/80: ICD-10-CM

## 2018-10-10 PROCEDURE — G0008 ADMIN INFLUENZA VIRUS VAC: HCPCS | Performed by: FAMILY MEDICINE

## 2018-10-10 PROCEDURE — 99214 OFFICE O/P EST MOD 30 MIN: CPT | Mod: 25 | Performed by: FAMILY MEDICINE

## 2018-10-10 PROCEDURE — 90662 IIV NO PRSV INCREASED AG IM: CPT | Performed by: FAMILY MEDICINE

## 2018-10-10 RX ORDER — FLUOXETINE HYDROCHLORIDE 20 MG/1
20 CAPSULE ORAL DAILY
Qty: 90 CAP | Refills: 3 | Status: SHIPPED | OUTPATIENT
Start: 2018-10-10 | End: 2019-01-01 | Stop reason: SDUPTHER

## 2018-10-10 RX ORDER — PHENTERMINE HYDROCHLORIDE 37.5 MG/1
37.5 TABLET ORAL
Qty: 90 TAB | Refills: 0 | Status: SHIPPED | OUTPATIENT
Start: 2018-10-10 | End: 2019-01-08 | Stop reason: SDUPTHER

## 2018-10-10 ASSESSMENT — PATIENT HEALTH QUESTIONNAIRE - PHQ9
SUM OF ALL RESPONSES TO PHQ9 QUESTIONS 1 AND 2: 2
SUM OF ALL RESPONSES TO PHQ QUESTIONS 1-9: 14
5. POOR APPETITE OR OVEREATING: 2
2. FEELING DOWN, DEPRESSED, IRRITABLE, OR HOPELESS: 0
4. FEELING TIRED OR HAVING LITTLE ENERGY: 2
9. THOUGHTS THAT YOU WOULD BE BETTER OFF DEAD, OR OF HURTING YOURSELF: 1
6. FEELING BAD ABOUT YOURSELF - OR THAT YOU ARE A FAILURE OR HAVE LET YOURSELF OR YOUR FAMILY DOWN: 2
3. TROUBLE FALLING OR STAYING ASLEEP OR SLEEPING TOO MUCH: 2
8. MOVING OR SPEAKING SO SLOWLY THAT OTHER PEOPLE COULD HAVE NOTICED. OR THE OPPOSITE, BEING SO FIGETY OR RESTLESS THAT YOU HAVE BEEN MOVING AROUND A LOT MORE THAN USUAL: 1
7. TROUBLE CONCENTRATING ON THINGS, SUCH AS READING THE NEWSPAPER OR WATCHING TELEVISION: 2
1. LITTLE INTEREST OR PLEASURE IN DOING THINGS: 2

## 2018-10-10 NOTE — ASSESSMENT & PLAN NOTE
This is a chronic health problem for this patient for which she has lost 5 pounds but she is extremely frustrated by the way things have gone over the summer.  She finds that she is developing depression and does not have any motivation to take care of herself.  We are going to get her on treatment and see her back in 6 weeks.  We will continue her phentermine for that time.

## 2018-10-10 NOTE — ASSESSMENT & PLAN NOTE
This is a new problem that the patient has noticed over the summer that she finds her self just feeling frustrated and tired all the time.  She finds her self being sad and having episodes of feeling sad and depressed.  Unfortunately her life has changed since the death of her  and this is not the years that she thought she be spending alone.  Her PHQ 9 is positive with a score of 14.  She denies suicidal or homicidal ideation but I definitely think she could feel better if we got her on some Prozac.  Depression Screen (PHQ-2/PHQ-9) 5/12/2017 4/25/2018 10/10/2018   PHQ-2 Total Score - - 2   PHQ-2 Total Score - - -   PHQ-2 Total Score 0 0 -   PHQ-9 Total Score - - 14       Interpretation of PHQ-9 Total Score   Score Severity   1-4 No Depression   5-9 Mild Depression   10-14 Moderate Depression   15-19 Moderately Severe Depression   20-27 Severe Depression

## 2018-10-10 NOTE — PROGRESS NOTES
CC:Diagnoses of Needs flu shot, HTN, goal below 130/80, Current moderate episode of major depressive disorder without prior episode (HCC), and Morbid obesity with BMI of 45.0-49.9, adult (HCC) were pertinent to this visit.    HISTORY OF PRESENT ILLNESS: Patient is a 63 y.o. female established patient who presents today to follow-up on her chronic health problems as well as a new problem.  Patient has not in the past felt that she was having any problems with sadness or depression but she becomes tearful as we start talking.  We then discussed at length her mood and ultimately decided she was depressed.  We will need to treat her and see her back in short follow-up.    Health Maintenance: Completed    HTN, goal below 130/80  This is a chronic health problem that is well controlled with current medications and lifestyle measures. Her BP is good at 120/76. The patient denies chest pain, shortness of breath or dyspnea on exertion.      Current moderate episode of major depressive disorder without prior episode (HCC)  This is a new problem that the patient has noticed over the summer that she finds her self just feeling frustrated and tired all the time.  She finds her self being sad and having episodes of feeling sad and depressed.  Unfortunately her life has changed since the death of her  and this is not the years that she thought she be spending alone.  Her PHQ 9 is positive with a score of 14.  She denies suicidal or homicidal ideation but I definitely think she could feel better if we got her on some Prozac.  Depression Screen (PHQ-2/PHQ-9) 5/12/2017 4/25/2018 10/10/2018   PHQ-2 Total Score - - 2   PHQ-2 Total Score - - -   PHQ-2 Total Score 0 0 -   PHQ-9 Total Score - - 14       Interpretation of PHQ-9 Total Score   Score Severity   1-4 No Depression   5-9 Mild Depression   10-14 Moderate Depression   15-19 Moderately Severe Depression   20-27 Severe Depression      Morbid obesity with BMI of 45.0-49.9,  adult ([facility])  This is a chronic health problem for this patient for which she has lost 5 pounds but she is extremely frustrated by the way things have gone over the summer.  She finds that she is developing depression and does not have any motivation to take care of herself.  We are going to get her on treatment and see her back in 6 weeks.  We will continue her phentermine for that time.      Patient Active Problem List    Diagnosis Date Noted   • Current moderate episode of major depressive disorder without prior episode ([facility]) 10/10/2018   • Prediabetes 07/18/2018   • External hemorrhoid 04/17/2015   • Vitamin D deficiency 03/06/2015   • Pure hyperglyceridemia 12/12/2014   • Vaginal vault prolapse 12/12/2014   • HTN, goal below 130/80 12/12/2014   • Morbid obesity with BMI of 45.0-49.9, adult ([facility]) 09/26/2014   • GERD (gastroesophageal reflux disease) 09/26/2014   • SOB (shortness of breath) on exertion 09/26/2014   • Rosacea, acne 09/26/2014      Allergies:Patient has no known allergies.    Current Outpatient Prescriptions   Medication Sig Dispense Refill   • phentermine (ADIPEX-P) 37.5 MG tablet Take 1 Tab by mouth every morning before breakfast for 90 days. 90 Tab 0   • FLUoxetine (PROZAC) 20 MG Cap Take 1 Cap by mouth every day. 90 Cap 3   • Zoster Vac Recomb Adjuvanted (SHINGRIX) 50 MCG Recon Susp 0.5 mL by Intramuscular route Once for 1 dose. 1 Each 1   • spironolactone (ALDACTONE) 25 MG Tab      • metoprolol SR (TOPROL XL) 50 MG TABLET SR 24 HR TAKE ONE TABLET BY MOUTH ONCE DAILY 90 Tab 4   • omeprazole (PRILOSEC) 40 MG delayed-release capsule TAKE ONE CAPSULE BY MOUTH ONCE DAILY 90 Cap 4   • spironolactone (ALDACTONE) 50 MG Tab TAKE ONE TABLET BY MOUTH ONCE DAILY 90 Tab 4   • topiramate (TOPAMAX) 50 MG tablet TAKE ONE TABLET BY MOUTH AT BEDTIME 90 Tab 4   • nystatin (MYCOSTATIN) 307223 UNIT/GM Ointment APPLY  OINTMENT TOPICALLY TO AFFECTED AREA TWICE DAILY 1 Tube 3   • metronidazole (METROCREAM) 0.75 %  cream Apply 1 Application to affected area(s) 2 times a day. 45 g 3     No current facility-administered medications for this visit.        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    - HEENT:  Negative for headaches, vision changes, hearing changes, ear pain, ear discharge, rhinorrhea, sinus congestion, sore throat, and neck pain.      - Respiratory: Negative for cough, sputum production, chest congestion, dyspnea, wheezing, and crackles.      - Cardiovascular: Negative for chest pain, palpitations, orthopnea, and bilateral lower extremity edema.     - Gastrointestinal: Negative for heartburn, nausea, vomiting, abdominal pain, hematochezia, melena, diarrhea, constipation, and greasy/foul-smelling stools.     - Genitourinary: Negative for dysuria, polyuria, hematuria, pyuria, urinary urgency, and urinary incontinence.     - Musculoskeletal: Negative for myalgias, back pain, and joint pain.     - Skin: Negative for rash, itching, cyanotic skin color change.     - Neurological: Negative for dizziness, tingling, tremors, focal sensory deficit, focal weakness and headaches.     - Endo/Heme/Allergies: Does not bruise/bleed easily.     - Psychiatric/Behavioral:Positive as in HPI but denies    suicidal/homicidal ideation and memory loss.          - NOTE: All other systems reviewed and are negative, except as in HPI.      Exam:    Blood pressure 120/76, pulse 79, temperature 36.2 °C (97.1 °F), temperature source Temporal, resp. rate 12, height 1.524 m (5'), weight 108.4 kg (239 lb), SpO2 97 %, not currently breastfeeding. Body mass index is 46.68 kg/m².    General:  Well nourished, well developed female in NAD  Head is grossly normal.  Neck: Supple without JVD or bruit. Thyroid is not enlarged.  Pulmonary: Clear to ausculation and percussion.  Normal effort. No rales, ronchi, or wheezing.  Cardiovascular: Regular rate and rhythm without murmur. Carotid and radial pulses are intact and equal bilaterally.  Extremities: no clubbing, cyanosis, or edema.    Please note that this dictation was created using voice recognition software. I have made every reasonable attempt to correct obvious errors, but I expect that there are errors of grammar and possibly content that I did not discover before finalizing the note.    Assessment/Plan:  1. Needs flu shot  Given today  - INFLUENZA VACCINE, HIGH DOSE (65+ ONLY)    2. HTN, goal below 130/80  Well-controlled we will continue with current medications.    3. Current moderate episode of major depressive disorder without prior episode (HCC)  Uncontrolled, will start with fluoxetine 20 mg daily.  Plan to see her back in 6 weeks.    4. Morbid obesity with BMI of 45.0-49.9, adult (HCC)  Renewed her meds for the coming 90 days but if she is not losing weight we will stop the medication.  - phentermine (ADIPEX-P) 37.5 MG tablet; Take 1 Tab by mouth every morning before breakfast for 90 days.  Dispense: 90 Tab; Refill: 0

## 2018-10-10 NOTE — ASSESSMENT & PLAN NOTE
This is a chronic health problem that is well controlled with current medications and lifestyle measures. Her BP is good at 120/76. The patient denies chest pain, shortness of breath or dyspnea on exertion.

## 2018-11-21 ENCOUNTER — OFFICE VISIT (OUTPATIENT)
Dept: MEDICAL GROUP | Facility: MEDICAL CENTER | Age: 63
End: 2018-11-21
Payer: MEDICARE

## 2018-11-21 VITALS
TEMPERATURE: 98.2 F | HEART RATE: 89 BPM | OXYGEN SATURATION: 95 % | DIASTOLIC BLOOD PRESSURE: 74 MMHG | RESPIRATION RATE: 14 BRPM | SYSTOLIC BLOOD PRESSURE: 122 MMHG | HEIGHT: 60 IN | WEIGHT: 241.18 LBS | BODY MASS INDEX: 47.35 KG/M2

## 2018-11-21 DIAGNOSIS — Z23 NEED FOR VACCINATION: ICD-10-CM

## 2018-11-21 DIAGNOSIS — M15.9 GENERALIZED OA: ICD-10-CM

## 2018-11-21 DIAGNOSIS — F32.1 CURRENT MODERATE EPISODE OF MAJOR DEPRESSIVE DISORDER WITHOUT PRIOR EPISODE (HCC): ICD-10-CM

## 2018-11-21 DIAGNOSIS — I10 HTN, GOAL BELOW 130/80: ICD-10-CM

## 2018-11-21 PROCEDURE — 99214 OFFICE O/P EST MOD 30 MIN: CPT | Mod: 25 | Performed by: FAMILY MEDICINE

## 2018-11-21 PROCEDURE — G0009 ADMIN PNEUMOCOCCAL VACCINE: HCPCS | Performed by: FAMILY MEDICINE

## 2018-11-21 PROCEDURE — 90732 PPSV23 VACC 2 YRS+ SUBQ/IM: CPT | Performed by: FAMILY MEDICINE

## 2018-11-21 NOTE — ASSESSMENT & PLAN NOTE
This is a chronic health problem for this patient well-controlled with current medications.  Blood pressure today 122/74.

## 2018-11-21 NOTE — ASSESSMENT & PLAN NOTE
This is a chronic health problem for which the patient is now seeing Dr. Sawyer from orthopedics.  He has done injections in both of her knees and is trying to help her there.  He does tell her that it is mostly osteoarthritis and she is probably going to end up with knee replacements.  He is encouraging her to lose weight so that she will do better with that.  She does use phentermine in her weight loss efforts.  We will see her back in January so we can keep her on the medication.

## 2018-11-21 NOTE — ASSESSMENT & PLAN NOTE
This is a chronic health problem for this patient that she has seen improvement since we started her on Prozac 20 mg daily.  It took a while for her to be able to get the medication but she does find that she feels happier and has not had the same senses of loss that she was having prior to going on the medication.  We will have her continue with that medication long-term.

## 2018-11-21 NOTE — PROGRESS NOTES
CC:Diagnoses of Current moderate episode of major depressive disorder without prior episode (HCC), HTN, goal below 130/80, Generalized OA, and Need for vaccination were pertinent to this visit.    HISTORY OF PRESENT ILLNESS: Patient is a 63 y.o. female established patient who presents today to follow-up on her chronic health problems and medications.    Health Maintenance: Completed    Current moderate episode of major depressive disorder without prior episode (HCC)  This is a chronic health problem for this patient that she has seen improvement since we started her on Prozac 20 mg daily.  It took a while for her to be able to get the medication but she does find that she feels happier and has not had the same senses of loss that she was having prior to going on the medication.  We will have her continue with that medication long-term.    HTN, goal below 130/80  This is a chronic health problem for this patient well-controlled with current medications.  Blood pressure today 122/74.    Generalized OA  This is a chronic health problem for which the patient is now seeing Dr. Sawyer from orthopedics.  He has done injections in both of her knees and is trying to help her there.  He does tell her that it is mostly osteoarthritis and she is probably going to end up with knee replacements.  He is encouraging her to lose weight so that she will do better with that.  She does use phentermine in her weight loss efforts.  We will see her back in January so we can keep her on the medication.      Patient Active Problem List    Diagnosis Date Noted   • Generalized OA 11/21/2018   • Current moderate episode of major depressive disorder without prior episode (HCC) 10/10/2018   • Prediabetes 07/18/2018   • External hemorrhoid 04/17/2015   • Vitamin D deficiency 03/06/2015   • Pure hyperglyceridemia 12/12/2014   • Vaginal vault prolapse 12/12/2014   • HTN, goal below 130/80 12/12/2014   • Morbid obesity with BMI of 45.0-49.9, adult  ([facility]) 09/26/2014   • GERD (gastroesophageal reflux disease) 09/26/2014   • SOB (shortness of breath) on exertion 09/26/2014   • Rosacea, acne 09/26/2014      Allergies:Patient has no known allergies.    Current Outpatient Prescriptions   Medication Sig Dispense Refill   • phentermine (ADIPEX-P) 37.5 MG tablet Take 1 Tab by mouth every morning before breakfast for 90 days. 90 Tab 0   • FLUoxetine (PROZAC) 20 MG Cap Take 1 Cap by mouth every day. 90 Cap 3   • spironolactone (ALDACTONE) 25 MG Tab      • metoprolol SR (TOPROL XL) 50 MG TABLET SR 24 HR TAKE ONE TABLET BY MOUTH ONCE DAILY 90 Tab 4   • omeprazole (PRILOSEC) 40 MG delayed-release capsule TAKE ONE CAPSULE BY MOUTH ONCE DAILY 90 Cap 4   • spironolactone (ALDACTONE) 50 MG Tab TAKE ONE TABLET BY MOUTH ONCE DAILY 90 Tab 4   • topiramate (TOPAMAX) 50 MG tablet TAKE ONE TABLET BY MOUTH AT BEDTIME 90 Tab 4   • nystatin (MYCOSTATIN) 555367 UNIT/GM Ointment APPLY  OINTMENT TOPICALLY TO AFFECTED AREA TWICE DAILY 1 Tube 3   • metronidazole (METROCREAM) 0.75 % cream Apply 1 Application to affected area(s) 2 times a day. 45 g 3     No current facility-administered medications for this visit.        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    -  HEENT:  Negative for headaches, vision changes, hearing changes, ear pain, ear discharge, rhinorrhea, sinus congestion, sore throat, and neck pain.      - Respiratory: Negative for cough, sputum production, chest congestion, dyspnea, wheezing, and crackles.      - Cardiovascular: Negative for chest pain, palpitations, orthopnea, and bilateral lower extremity edema.     - Gastrointestinal: Negative for heartburn, nausea, vomiting, abdominal pain, hematochezia, melena, diarrhea, constipation, and greasy/foul-smelling stools.     - Genitourinary: Negative for dysuria, polyuria, hematuria, pyuria, urinary urgency, and urinary incontinence.     - Musculoskeletal: Positive for bilateral knee pain now getting injections also having bilateral hip pain.      - Endo/Heme/Allergies: Does not bruise/bleed easily.     - Psychiatric/Behavioral: Depression definitely improved no longer having suicidal or homicidal ideation and feels that her mood overall is back to her baseline.     Exam:    Blood pressure 122/74, pulse 89, temperature 36.8 °C (98.2 °F), temperature source Temporal, resp. rate 14, height 1.524 m (5'), weight 109.4 kg (241 lb 2.9 oz), SpO2 95 %, not currently breastfeeding. Body mass index is 47.1 kg/m².    General:  Well nourished, well developed female in NAD  Head is grossly normal.  Neck: Supple without JVD or bruit. Thyroid is not enlarged.  Pulmonary: Clear to ausculation and percussion.  Normal effort. No rales, ronchi, or wheezing.  Cardiovascular: Regular rate and rhythm without murmur. Carotid and radial pulses are intact and equal bilaterally.  Extremities: no clubbing, cyanosis, or edema.  Patient was seen for 25 minutes face to face of which, 20 minutes was spent counseling regarding medications interactions and side effects, also had a discussion regarding her current episode of depression which does seem to have improved with the Prozac.  Patient now working on getting her joints to be less painful  so she can get physically active and lose weight.  She also needs a pneumonia shot today..    Please note that this dictation was created using voice recognition software. I have made every reasonable attempt to correct obvious errors, but I expect that there are errors of grammar and possibly content that I did not discover before finalizing the note.    Assessment/Plan:  1. Current moderate episode of major depressive disorder without prior episode (HCC)  Controlled, continue with current meds and lifestyle.      2. HTN, goal below 130/80  Controlled, continue with current meds and lifestyle.      3. Generalized OA  Uncontrolled but patient working with Dr. Sawyer for regular injections and to get this under better control.  Patient wants to lose weight so that she can have joint replacement in the future.    4. Need for vaccination  Given today.  - Pneumococal Polysaccharide Vaccine 23-Valent =>3yo SQ/IM

## 2019-01-01 ENCOUNTER — HOSPITAL ENCOUNTER (OUTPATIENT)
Dept: CARDIOLOGY | Facility: MEDICAL CENTER | Age: 64
End: 2019-04-11
Attending: INTERNAL MEDICINE
Payer: MEDICARE

## 2019-01-01 ENCOUNTER — TELEPHONE (OUTPATIENT)
Dept: CARDIOLOGY | Facility: MEDICAL CENTER | Age: 64
End: 2019-01-01

## 2019-01-01 ENCOUNTER — APPOINTMENT (OUTPATIENT)
Dept: MEDICAL GROUP | Facility: MEDICAL CENTER | Age: 64
End: 2019-01-01
Payer: MEDICARE

## 2019-01-01 ENCOUNTER — HOSPITAL ENCOUNTER (OUTPATIENT)
Dept: LAB | Facility: MEDICAL CENTER | Age: 64
End: 2019-05-16
Attending: FAMILY MEDICINE
Payer: MEDICARE

## 2019-01-01 ENCOUNTER — OFFICE VISIT (OUTPATIENT)
Dept: URGENT CARE | Facility: PHYSICIAN GROUP | Age: 64
End: 2019-01-01
Payer: MEDICARE

## 2019-01-01 ENCOUNTER — OFFICE VISIT (OUTPATIENT)
Dept: MEDICAL GROUP | Facility: MEDICAL CENTER | Age: 64
End: 2019-01-01
Payer: MEDICARE

## 2019-01-01 ENCOUNTER — APPOINTMENT (OUTPATIENT)
Dept: RADIOLOGY | Facility: MEDICAL CENTER | Age: 64
End: 2019-01-01
Attending: INTERNAL MEDICINE
Payer: MEDICARE

## 2019-01-01 ENCOUNTER — OFFICE VISIT (OUTPATIENT)
Dept: CARDIOLOGY | Facility: PHYSICIAN GROUP | Age: 64
End: 2019-01-01
Payer: MEDICARE

## 2019-01-01 VITALS
DIASTOLIC BLOOD PRESSURE: 60 MMHG | BODY MASS INDEX: 47.71 KG/M2 | OXYGEN SATURATION: 98 % | HEIGHT: 60 IN | WEIGHT: 243 LBS | HEART RATE: 98 BPM | SYSTOLIC BLOOD PRESSURE: 118 MMHG

## 2019-01-01 VITALS
SYSTOLIC BLOOD PRESSURE: 130 MMHG | BODY MASS INDEX: 51.63 KG/M2 | OXYGEN SATURATION: 95 % | TEMPERATURE: 97.5 F | DIASTOLIC BLOOD PRESSURE: 70 MMHG | WEIGHT: 263 LBS | HEIGHT: 60 IN | HEART RATE: 116 BPM | RESPIRATION RATE: 18 BRPM

## 2019-01-01 VITALS
DIASTOLIC BLOOD PRESSURE: 80 MMHG | RESPIRATION RATE: 16 BRPM | SYSTOLIC BLOOD PRESSURE: 124 MMHG | TEMPERATURE: 96.6 F | HEIGHT: 60 IN | WEIGHT: 242 LBS | BODY MASS INDEX: 47.51 KG/M2 | OXYGEN SATURATION: 100 % | HEART RATE: 112 BPM

## 2019-01-01 VITALS
BODY MASS INDEX: 48.39 KG/M2 | OXYGEN SATURATION: 90 % | HEART RATE: 91 BPM | TEMPERATURE: 97.6 F | HEIGHT: 60 IN | RESPIRATION RATE: 14 BRPM | WEIGHT: 246.47 LBS | DIASTOLIC BLOOD PRESSURE: 80 MMHG | SYSTOLIC BLOOD PRESSURE: 120 MMHG

## 2019-01-01 DIAGNOSIS — R05.9 COUGH: ICD-10-CM

## 2019-01-01 DIAGNOSIS — R06.09 DOE (DYSPNEA ON EXERTION): ICD-10-CM

## 2019-01-01 DIAGNOSIS — E78.1 PURE HYPERGLYCERIDEMIA: ICD-10-CM

## 2019-01-01 DIAGNOSIS — G47.33 OSA (OBSTRUCTIVE SLEEP APNEA): ICD-10-CM

## 2019-01-01 DIAGNOSIS — I10 HTN, GOAL BELOW 130/80: ICD-10-CM

## 2019-01-01 DIAGNOSIS — K21.9 GASTROESOPHAGEAL REFLUX DISEASE WITHOUT ESOPHAGITIS: ICD-10-CM

## 2019-01-01 DIAGNOSIS — R07.89 OTHER CHEST PAIN: ICD-10-CM

## 2019-01-01 DIAGNOSIS — J98.8 WHEEZING-ASSOCIATED RESPIRATORY INFECTION (WARI): ICD-10-CM

## 2019-01-01 DIAGNOSIS — Z91.89 OTHER SPECIFIED PERSONAL RISK FACTORS, NOT ELSEWHERE CLASSIFIED: ICD-10-CM

## 2019-01-01 DIAGNOSIS — R06.02 SHORTNESS OF BREATH ON EXERTION: ICD-10-CM

## 2019-01-01 DIAGNOSIS — R06.02 SOB (SHORTNESS OF BREATH) ON EXERTION: ICD-10-CM

## 2019-01-01 DIAGNOSIS — E66.01 MORBID OBESITY WITH BMI OF 45.0-49.9, ADULT (HCC): ICD-10-CM

## 2019-01-01 DIAGNOSIS — R94.30 ELEVATED RIGHT VENTRICULAR END-DIASTOLIC PRESSURE: ICD-10-CM

## 2019-01-01 LAB
ALBUMIN SERPL BCP-MCNC: 3.9 G/DL (ref 3.2–4.9)
ALBUMIN/GLOB SERPL: 1.2 G/DL
ALP SERPL-CCNC: 74 U/L (ref 30–99)
ALT SERPL-CCNC: 11 U/L (ref 2–50)
ANION GAP SERPL CALC-SCNC: 10 MMOL/L (ref 0–11.9)
AST SERPL-CCNC: 14 U/L (ref 12–45)
BILIRUB SERPL-MCNC: 0.4 MG/DL (ref 0.1–1.5)
BUN SERPL-MCNC: 22 MG/DL (ref 8–22)
CALCIUM SERPL-MCNC: 8.9 MG/DL (ref 8.5–10.5)
CHLORIDE SERPL-SCNC: 108 MMOL/L (ref 96–112)
CHOLEST SERPL-MCNC: 180 MG/DL (ref 100–199)
CO2 SERPL-SCNC: 22 MMOL/L (ref 20–33)
CREAT SERPL-MCNC: 1.22 MG/DL (ref 0.5–1.4)
FASTING STATUS PATIENT QL REPORTED: NORMAL
GLOBULIN SER CALC-MCNC: 3.3 G/DL (ref 1.9–3.5)
GLUCOSE SERPL-MCNC: 105 MG/DL (ref 65–99)
HDLC SERPL-MCNC: 47 MG/DL
LDLC SERPL CALC-MCNC: 107 MG/DL
LV EJECT FRACT  99904: 60
LV EJECT FRACT MOD 2C 99903: 63.96
LV EJECT FRACT MOD 4C 99902: 59.56
LV EJECT FRACT MOD BP 99901: 62.43
POTASSIUM SERPL-SCNC: 4.1 MMOL/L (ref 3.6–5.5)
PROT SERPL-MCNC: 7.2 G/DL (ref 6–8.2)
SODIUM SERPL-SCNC: 140 MMOL/L (ref 135–145)
TRIGL SERPL-MCNC: 130 MG/DL (ref 0–149)

## 2019-01-01 PROCEDURE — 93306 TTE W/DOPPLER COMPLETE: CPT | Mod: 26 | Performed by: INTERNAL MEDICINE

## 2019-01-01 PROCEDURE — 99214 OFFICE O/P EST MOD 30 MIN: CPT | Performed by: PHYSICIAN ASSISTANT

## 2019-01-01 PROCEDURE — 80053 COMPREHEN METABOLIC PANEL: CPT

## 2019-01-01 PROCEDURE — 99204 OFFICE O/P NEW MOD 45 MIN: CPT | Performed by: INTERNAL MEDICINE

## 2019-01-01 PROCEDURE — 93306 TTE W/DOPPLER COMPLETE: CPT

## 2019-01-01 PROCEDURE — 99214 OFFICE O/P EST MOD 30 MIN: CPT | Performed by: FAMILY MEDICINE

## 2019-01-01 PROCEDURE — 36415 COLL VENOUS BLD VENIPUNCTURE: CPT

## 2019-01-01 PROCEDURE — 80061 LIPID PANEL: CPT

## 2019-01-01 RX ORDER — DEXTROMETHORPHAN HYDROBROMIDE AND PROMETHAZINE HYDROCHLORIDE 15; 6.25 MG/5ML; MG/5ML
5 SYRUP ORAL EVERY 4 HOURS PRN
Qty: 120 ML | Refills: 0 | Status: SHIPPED | OUTPATIENT
Start: 2019-01-01 | End: 2019-01-01 | Stop reason: SDUPTHER

## 2019-01-01 RX ORDER — DEXTROMETHORPHAN HYDROBROMIDE AND PROMETHAZINE HYDROCHLORIDE 15; 6.25 MG/5ML; MG/5ML
5 SYRUP ORAL EVERY 4 HOURS PRN
Qty: 120 ML | Refills: 0 | Status: SHIPPED | OUTPATIENT
Start: 2019-01-01

## 2019-01-01 RX ORDER — SPIRONOLACTONE 50 MG/1
TABLET, FILM COATED ORAL
Qty: 90 TAB | Refills: 4 | Status: SHIPPED | OUTPATIENT
Start: 2019-01-01

## 2019-01-01 RX ORDER — METOPROLOL SUCCINATE 50 MG/1
TABLET, EXTENDED RELEASE ORAL
Qty: 90 TAB | Refills: 4 | Status: SHIPPED | OUTPATIENT
Start: 2019-01-01

## 2019-01-01 RX ORDER — AZITHROMYCIN 250 MG/1
TABLET, FILM COATED ORAL
Qty: 6 TAB | Refills: 0 | Status: SHIPPED | OUTPATIENT
Start: 2019-01-01

## 2019-01-01 RX ORDER — NYSTATIN 100000 U/G
OINTMENT TOPICAL
Qty: 1 TUBE | Refills: 3 | Status: SHIPPED | OUTPATIENT
Start: 2019-01-01

## 2019-01-01 RX ORDER — TOPIRAMATE SPINKLE 25 MG/1
25 CAPSULE ORAL DAILY
Qty: 90 CAP | Refills: 3 | Status: SHIPPED | OUTPATIENT
Start: 2019-01-01

## 2019-01-01 RX ORDER — FLUOXETINE HYDROCHLORIDE 20 MG/1
20 CAPSULE ORAL DAILY
Qty: 90 CAP | Refills: 3 | Status: SHIPPED | OUTPATIENT
Start: 2019-01-01

## 2019-01-01 RX ORDER — METHYLPREDNISOLONE 4 MG/1
4 TABLET ORAL SEE ADMIN INSTRUCTIONS
Qty: 21 TAB | Refills: 0 | Status: SHIPPED | OUTPATIENT
Start: 2019-01-01

## 2019-01-01 RX ORDER — OSELTAMIVIR PHOSPHATE 75 MG/1
75 CAPSULE ORAL 2 TIMES DAILY
Qty: 10 CAP | Refills: 0 | Status: SHIPPED | OUTPATIENT
Start: 2019-01-01 | End: 2019-01-01

## 2019-01-01 RX ORDER — ALBUTEROL SULFATE 90 UG/1
2 AEROSOL, METERED RESPIRATORY (INHALATION) EVERY 4 HOURS PRN
Qty: 1 INHALER | Refills: 0 | Status: SHIPPED | OUTPATIENT
Start: 2019-01-01

## 2019-01-01 RX ORDER — ALBUTEROL SULFATE 90 UG/1
AEROSOL, METERED RESPIRATORY (INHALATION)
Qty: 1 INHALER | Refills: 2 | Status: SHIPPED | OUTPATIENT
Start: 2019-01-01

## 2019-01-01 RX ORDER — OMEPRAZOLE 40 MG/1
CAPSULE, DELAYED RELEASE ORAL
Qty: 90 CAP | Refills: 4 | Status: SHIPPED | OUTPATIENT
Start: 2019-01-01

## 2019-01-01 ASSESSMENT — ENCOUNTER SYMPTOMS
INSOMNIA: 0
CHILLS: 0
HEMOPTYSIS: 0
NECK PAIN: 0
SHORTNESS OF BREATH: 1
DOUBLE VISION: 0
MYALGIAS: 0
MEMORY LOSS: 0
NAUSEA: 0
DEPRESSION: 0
BRUISES/BLEEDS EASILY: 0
HEARTBURN: 0
SPUTUM PRODUCTION: 0
WHEEZING: 0
LOSS OF CONSCIOUSNESS: 0
PALPITATIONS: 0
NERVOUS/ANXIOUS: 0
BLURRED VISION: 0
FEVER: 0
DIZZINESS: 1

## 2019-01-08 ENCOUNTER — OFFICE VISIT (OUTPATIENT)
Dept: MEDICAL GROUP | Facility: MEDICAL CENTER | Age: 64
End: 2019-01-08
Payer: MEDICARE

## 2019-01-08 VITALS
TEMPERATURE: 98.2 F | RESPIRATION RATE: 12 BRPM | HEART RATE: 88 BPM | DIASTOLIC BLOOD PRESSURE: 70 MMHG | OXYGEN SATURATION: 98 % | WEIGHT: 238.1 LBS | SYSTOLIC BLOOD PRESSURE: 120 MMHG | HEIGHT: 60 IN | BODY MASS INDEX: 46.75 KG/M2

## 2019-01-08 DIAGNOSIS — I10 HTN, GOAL BELOW 130/80: ICD-10-CM

## 2019-01-08 DIAGNOSIS — F32.1 CURRENT MODERATE EPISODE OF MAJOR DEPRESSIVE DISORDER WITHOUT PRIOR EPISODE (HCC): ICD-10-CM

## 2019-01-08 DIAGNOSIS — E66.01 MORBID OBESITY WITH BMI OF 45.0-49.9, ADULT (HCC): ICD-10-CM

## 2019-01-08 PROCEDURE — 99214 OFFICE O/P EST MOD 30 MIN: CPT | Performed by: FAMILY MEDICINE

## 2019-01-08 RX ORDER — PHENTERMINE HYDROCHLORIDE 37.5 MG/1
37.5 TABLET ORAL
Qty: 90 TAB | Refills: 0 | Status: SHIPPED | OUTPATIENT
Start: 2019-01-08 | End: 2019-01-01

## 2019-01-08 RX ORDER — PHENTERMINE HYDROCHLORIDE 37.5 MG/1
37.5 TABLET ORAL
Qty: 90 TAB | Refills: 0 | Status: SHIPPED
Start: 2019-01-08 | End: 2019-01-08 | Stop reason: SDUPTHER

## 2019-01-08 RX ORDER — TOPIRAMATE 50 MG/1
25 TABLET, FILM COATED ORAL
Qty: 45 TAB | Refills: 3
Start: 2019-01-08 | End: 2019-01-01

## 2019-01-08 NOTE — ASSESSMENT & PLAN NOTE
This is a chronic health problem for this patient that seems to have been greatly helped by getting her on fluoxetine 20 mg daily.  She made it through the holidays well.  She has no suicidal or homicidal ideation.  Her mood appears to be more upbeat and very stable.  We will have her continue with the medication long-term.

## 2019-01-08 NOTE — PROGRESS NOTES
CC:Diagnoses of Morbid obesity with BMI of 45.0-49.9, adult (HCC), Current moderate episode of major depressive disorder without prior episode (HCC), and HTN, goal below 130/80 were pertinent to this visit.    HISTORY OF PRESENT ILLNESS: Patient is a 63 y.o. female established patient who presents today to  follow up on chronic problems as outlined below.    Since I last saw her, she had her teeth done in [place].      Health Maintenance: Patient will be getting a colonoscopy during 2019    Current moderate episode of major depressive disorder without prior episode (HCC)  This is a chronic health problem for this patient that seems to have been greatly helped by getting her on fluoxetine 20 mg daily.  She made it through the holidays well.  She has no suicidal or homicidal ideation.  Her mood appears to be more upbeat and very stable.  We will have her continue with the medication long-term.    Morbid obesity with BMI of 45.0-49.9, adult (HCC)  This is a chronic health problem for this patient that we are working on with phentermine and topiramate.  She is taking 37.5 mg of phentermine and then 50 mg of topiramate at bedtime.  She still has the dizziness side effect.  I am going to have her cut her topiramate dose down to 25 mg see if the dizziness gets better.  She also knows that she has to keep her fluid intake up.  She was able to lose 3 pounds.  We will have her continue with same medications and plan will be to see her back in 3 months.    Obtained and reviewed patient utilization report from [facility] pharmacy database on 1/8/2019 2:35 PM  prior to writing prescription for controlled substance II, III or IV per [place] bill . Based on assessment of the report,my physical exam if necessary and the patient's health problem, the prescription is medically necessary.       HTN, goal below 130/80  This is a chronic health problem that is well controlled with current medications and lifestyle measures. BP is  great 120/70.       Patient Active Problem List    Diagnosis Date Noted   • Generalized OA 11/21/2018   • Current moderate episode of major depressive disorder without prior episode ([facility]) 10/10/2018   • Prediabetes 07/18/2018   • External hemorrhoid 04/17/2015   • Vitamin D deficiency 03/06/2015   • Pure hyperglyceridemia 12/12/2014   • Vaginal vault prolapse 12/12/2014   • HTN, goal below 130/80 12/12/2014   • Morbid obesity with BMI of 45.0-49.9, adult ([facility]) 09/26/2014   • GERD (gastroesophageal reflux disease) 09/26/2014   • SOB (shortness of breath) on exertion 09/26/2014   • Rosacea, acne 09/26/2014      Allergies:Patient has no known allergies.    Current Outpatient Prescriptions   Medication Sig Dispense Refill   • topiramate (TOPAMAX) 50 MG tablet Take 0.5 Tabs by mouth every bedtime. 45 Tab 3   • phentermine (ADIPEX-P) 37.5 MG tablet Take 1 Tab by mouth every morning before breakfast for 90 days. 90 Tab 0   • FLUoxetine (PROZAC) 20 MG Cap Take 1 Cap by mouth every day. 90 Cap 3   • spironolactone (ALDACTONE) 25 MG Tab      • metoprolol SR (TOPROL XL) 50 MG TABLET SR 24 HR TAKE ONE TABLET BY MOUTH ONCE DAILY 90 Tab 4   • omeprazole (PRILOSEC) 40 MG delayed-release capsule TAKE ONE CAPSULE BY MOUTH ONCE DAILY 90 Cap 4   • spironolactone (ALDACTONE) 50 MG Tab TAKE ONE TABLET BY MOUTH ONCE DAILY 90 Tab 4   • nystatin (MYCOSTATIN) 499886 UNIT/GM Ointment APPLY  OINTMENT TOPICALLY TO AFFECTED AREA TWICE DAILY 1 Tube 3   • metronidazole (METROCREAM) 0.75 % cream Apply 1 Application to affected area(s) 2 times a day. 45 g 3     No current facility-administered medications for this visit.        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    - HEENT:  Negative for headaches, vision changes, hearing changes, ear pain, ear discharge, rhinorrhea, sinus congestion, sore throat, and neck pain.      - Respiratory: Negative for cough, sputum production, chest congestion, dyspnea, wheezing, and crackles.      - Cardiovascular: Negative for chest pain, palpitations, orthopnea, and bilateral lower extremity edema.     - Gastrointestinal: Negative for heartburn, nausea, vomiting, abdominal pain, hematochezia, melena, diarrhea, constipation, and greasy/foul-smelling stools.     - Genitourinary: Negative for dysuria, polyuria, hematuria, pyuria, urinary urgency, and urinary incontinence.     - Musculoskeletal: Negative for myalgias, back pain, and joint pain.     - Skin: Negative for rash, itching, cyanotic skin color change.     - Neurological: Negative for dizziness, tingling, tremors, focal sensory deficit, focal weakness and headaches.     - Endo/Heme/Allergies: Does not bruise/bleed easily.     - Psychiatric/Behavioral: Negative for depression, suicidal/homicidal ideation and memory loss.          - NOTE: All other systems reviewed and are negative, except as in HPI.      Exam:    Blood pressure 120/70, pulse 88, temperature 36.8 °C (98.2 °F), temperature source Temporal, resp. rate 12, height 1.524 m (5'), weight 108 kg (238 lb 1.6 oz), SpO2 98 %, not currently breastfeeding. Body mass index is 46.5 kg/m².    General:  Well nourished, well developed female in NAD  Head is grossly normal.  Neck: Supple without  JVD or bruit. Thyroid is not enlarged.  Pulmonary: Clear to ausculation and percussion.  Normal effort. No rales, ronchi, or wheezing.  Cardiovascular: Regular rate and rhythm without murmur. Carotid and radial pulses are intact and equal bilaterally.  Extremities: no clubbing, cyanosis, or edema.    Please note that this dictation was created using voice recognition software. I have made every reasonable attempt to correct obvious errors, but I expect that there are errors of grammar and possibly content that I did not discover before finalizing the note.    Assessment/Plan:  1. Morbid obesity with BMI of 45.0-49.9, adult (HCC)  Uncontrolled, patient still having some side effects from the Topamax.  We will have her cut the tablet in half and try 25 mg dose and then continue with phentermine.  Plan will be to see her back in 3 months.    And provided today.  - topiramate (TOPAMAX) 50 MG tablet; Take 0.5 Tabs by mouth every bedtime.  Dispense: 45 Tab; Refill: 3  - phentermine (ADIPEX-P) 37.5 MG tablet; Take 1 Tab by mouth every morning before breakfast for 90 days.  Dispense: 90 Tab; Refill: 0    2. Current moderate episode of major depressive disorder without prior episode (HCC)  Controlled, continue with current meds and lifestyle.      3. HTN, goal below 130/80  Controlled, continue with current meds and lifestyle.

## 2019-01-08 NOTE — ASSESSMENT & PLAN NOTE
This is a chronic health problem that is well controlled with current medications and lifestyle measures. BP is great 120/70.

## 2019-01-08 NOTE — ASSESSMENT & PLAN NOTE
This is a chronic health problem for this patient that we are working on with phentermine and topiramate.  She is taking 37.5 mg of phentermine and then 50 mg of topiramate at bedtime.  She still has the dizziness side effect.  I am going to have her cut her topiramate dose down to 25 mg see if the dizziness gets better.  She also knows that she has to keep her fluid intake up.  She was able to lose 3 pounds.  We will have her continue with same medications and plan will be to see her back in 3 months.    Obtained and reviewed patient utilization report from [facility] pharmacy database on 1/8/2019 2:35 PM  prior to writing prescription for controlled substance II, III or IV per [place] bill . Based on assessment of the report,my physical exam if necessary and the patient's health problem, the prescription is medically necessary.

## 2019-02-06 NOTE — PROGRESS NOTES
Chief Complaint:    Chief Complaint   Patient presents with   • Shortness of Breath     with exertion       History of Present Illness:    She complains of shortness of breath with physical exertion. She reports this is a more recent problem, but on her chart is Shortness of breath on exertion which was addressed at visit 9/26/14 with EKG and CXR done 9/29/14. Also on her chart is Asthma. She has used MDI in past but does not currently have one. She does not feel sick like she has an infection. She wonders if symptoms are due to her heart and is interested in seeing a Cardiologist. No chest pain.      Review of Systems:    Constitutional: Negative for fever, chills, and diaphoresis.   Eyes: Negative for change in vision, photophobia, pain, redness, and discharge.  ENT: Negative for ear pain, ear discharge, hearing loss, tinnitus, nasal congestion, nosebleeds, and sore throat.    Respiratory: See HPI.   Cardiovascular: Negative for chest pain, palpitations, orthopnea, claudication, leg swelling, and PND.   Gastrointestinal: Negative for abdominal pain, nausea, vomiting, diarrhea, constipation, blood in stool, and melena.   Genitourinary: Negative for dysuria, urinary urgency, urinary frequency, hematuria, and flank pain.   Musculoskeletal: No new symptoms.  Skin: Negative for rash and itching.   Neurological: No new symptoms.  Endo: Negative for polydipsia.   Heme: Does not bruise/bleed easily.   Psychiatric/Behavioral: No new symptoms.      Past Medical History:    Past Medical History:   Diagnosis Date   • Arrhythmia    • Arthritis    • Asthma    • Cough, persistent 9/26/2014   • Current moderate episode of major depressive disorder without prior episode ([facility]) 10/10/2018   • Dizziness of unknown cause 9/26/2014   • External hemorrhoid 4/17/2015   • Fatigue 9/26/2014   • Generalized OA 11/21/2018   • GERD (gastroesophageal reflux disease) 9/26/2014   • Headache(784.0)    • Headache, classical migraine    • HTN, goal  below 130/80 12/12/2014    Pt with multiple elevated readings at 170/110 and 178/112.  Will try spironolactone first   • Morbid obesity with BMI of 40.0-44.9, adult ([facility]) 9/26/2014   • Morbid obesity with BMI of 45.0-49.9, adult ([facility]) 9/26/2014    UDS done and consistent:  5/12/17    • Prediabetes 7/18/2018   • Pure hypercholesterolemia 12/12/2014   • Pure hyperglyceridemia 12/12/2014   • Rosacea, acne 9/26/2014   • SOB (shortness of breath) on exertion 9/26/2014   • Vaginal vault prolapse 12/12/2014    Pt had a mesh procedure with hysterectomy in 2001, now having problems and wants evaluation but refuses surgery.Wants to know options   • Vitamin D deficiency 3/6/2015    Initial 15 without replacement, now on 4K replacement     Past Surgical History:    Past Surgical History:   Procedure Laterality Date   • HYSTERECTOMY, VAGINAL  2001    Done by Dr. Hernandez   • ANTERIOR AND POSTERIOR REPAIR  2001    due to incontinence   • ABDOMINAL HYSTERECTOMY TOTAL     • PRIMARY C SECTION      X2     Social History:    Social History     Social History   • Marital status:      Spouse name: N/A   • Number of children: N/A   • Years of education: N/A     Occupational History   • Not on file.     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, resides in [place], [place]     Family History:    Family History   Problem Relation Age of Onset   • Cancer Mother         stomach cancer   • Cancer Father         blood cancer   • Diabetes Sister    • Hyperlipidemia Sister    • Hypertension Sister    • Heart Attack Brother    • Heart Disease Brother    • Hypertension Brother    • Heart Attack Brother    • Heart Disease Brother    •  Hypertension Brother    • Arthritis Brother         knee replacement     Medications:    Current Outpatient Prescriptions on File Prior to Visit   Medication Sig Dispense Refill   • topiramate (TOPAMAX) 50 MG tablet Take 0.5 Tabs by mouth every bedtime. 45 Tab 3   • phentermine (ADIPEX-P) 37.5 MG tablet Take 1 Tab by mouth every morning before breakfast for 90 days. 90 Tab 0   • FLUoxetine (PROZAC) 20 MG Cap Take 1 Cap by mouth every day. 90 Cap 3   • metoprolol SR (TOPROL XL) 50 MG TABLET SR 24 HR TAKE ONE TABLET BY MOUTH ONCE DAILY 90 Tab 4   • omeprazole (PRILOSEC) 40 MG delayed-release capsule TAKE ONE CAPSULE BY MOUTH ONCE DAILY 90 Cap 4   • spironolactone (ALDACTONE) 50 MG Tab TAKE ONE TABLET BY MOUTH ONCE DAILY 90 Tab 4   • spironolactone (ALDACTONE) 25 MG Tab      • nystatin (MYCOSTATIN) 388697 UNIT/GM Ointment APPLY  OINTMENT TOPICALLY TO AFFECTED AREA TWICE DAILY 1 Tube 3   • metronidazole (METROCREAM) 0.75 % cream Apply 1 Application to affected area(s) 2 times a day. 45 g 3     No current facility-administered medications on file prior to visit.      Allergies:    No Known Allergies      Vitals:    Vitals:    02/06/19 1420   BP: 124/80   BP Location: Right arm   Patient Position: Sitting   BP Cuff Size: Large adult   Pulse: (!) 112   Resp: 16   Temp: 35.9 °C (96.6 °F)   TempSrc: Temporal   SpO2: 100%   Weight: 109.8 kg (242 lb)   Height: 1.524 m (5')       Physical Exam:    Constitutional: Vital signs reviewed. Appears well-developed and well-nourished. No acute distress.   Eyes: Sclera white, conjunctivae clear.   ENT: External ears normal. Hearing normal. Nasal mucosa pink. Lips are normal. Oral mucosa pink and moist. Posterior pharynx: WNL.  Neck: Neck supple.   Cardiovascular: Tachycardic. Regular rhythm. No murmur.  Pulmonary/Chest: Respirations non-labored. Clear to auscultation bilaterally.  Musculoskeletal: Normal gait. No muscular atrophy or weakness.  Neurological: Alert and oriented to person,  place, and time. Muscle tone normal. Coordination normal.   Skin: No rashes or lesions. Warm, dry, normal turgor.  Psychiatric: Normal mood and affect. Behavior is normal. Judgment and thought content normal.       Assessment / Plan:    1. Shortness of breath on exertion  - REFERRAL TO CARDIOLOGY  - albuterol 108 (90 Base) MCG/ACT Aero Soln inhalation aerosol; 2 PUFFS EVERY 4 HOURS ONLY IF NEEDED FOR COUGH, WHEEZING, OR SHORTNESS OF BREATH.  Dispense: 1 Inhaler; Refill: 2      Discussed with her DDX, management options, and risks, benefits, and alternatives to treatment plan agreed upon.    Advised limited here on diagnostic testing.    She declines EKG in office today.    We do not have imaging here. She declines order for CXR to be done at other facility.    She would like to get Rx for MDI to use prn.    She would like a referral to Cardiology. Declines referral to Pulmonary.    Agreeable to medication prescribed and referral to Cardiology ordered.    Advised if she worsens, she should be seen in the Emergency Room where more diagnostic testing can be done compared to here.    May return to urgent care if needed.

## 2019-03-21 NOTE — PROGRESS NOTES
Chief Complaint   Patient presents with   • Shortness of Breath       Subjective:   Leola Hicks is a 63 y.o. female who presents today as a new patient.  She was sent by Dr. Godinez in regards to her breathlessness with exertion.  She is in with her daughter.  They are very worried about her.  She did not want to see a doctor nor go to the emergency room    For about 6 weeks she has had terrible breathlessness with exertion.  This happened suddenly was not associated with chest pains but did have nausea for some time may be a few days    Has been reluctant to seek medical care.  She refused an x-ray and an EKG, she does deny this though today    No syncope but gets very dizzy when she walks quickly.  Oxygen has not been low.  No fevers or chills    Past Medical History:   Diagnosis Date   • Arrhythmia    • Arthritis    • Asthma    • Cough, persistent 9/26/2014   • Current moderate episode of major depressive disorder without prior episode ([facility]) 10/10/2018   • Dizziness of unknown cause 9/26/2014   • External hemorrhoid 4/17/2015   • Fatigue 9/26/2014   • Generalized OA 11/21/2018   • GERD (gastroesophageal reflux disease) 9/26/2014   • Headache(784.0)    • Headache, classical migraine    • HTN, goal below 130/80 12/12/2014    Pt with multiple elevated readings at 170/110 and 178/112.  Will try spironolactone first   • Morbid obesity with BMI of 40.0-44.9, adult ([facility]) 9/26/2014   • Morbid obesity with BMI of 45.0-49.9, adult ([facility]) 9/26/2014    UDS done and consistent:  5/12/17    • Prediabetes 7/18/2018   • Pure hypercholesterolemia 12/12/2014   • Pure hyperglyceridemia 12/12/2014   • Rosacea, acne 9/26/2014   • SOB (shortness of breath) on exertion 9/26/2014   • Vaginal vault prolapse 12/12/2014    Pt had a mesh procedure with hysterectomy in 2001, now having problems and wants evaluation but refuses surgery.Wants to know options   • Vitamin D deficiency 3/6/2015    Initial 15 without replacement, now on 4K  replacement     Past Surgical History:   Procedure Laterality Date   • HYSTERECTOMY, VAGINAL  2001    Done by Dr. Hernandez   • ANTERIOR AND POSTERIOR REPAIR  2001    due to incontinence   • ABDOMINAL HYSTERECTOMY TOTAL     • PRIMARY C SECTION      X2     Family History   Problem Relation Age of Onset   • Cancer Mother         stomach cancer   • Cancer Father         blood cancer   • Diabetes Sister    • Hyperlipidemia Sister    • Hypertension Sister    • Heart Attack Brother    • Heart Disease Brother    • Hypertension Brother    • Heart Attack Brother    • Heart Disease Brother    • Hypertension Brother    • Arthritis Brother         knee replacement     Social History     Social History   • Marital status:      Spouse name: N/A   • Number of children: N/A   • Years of education: N/A     Occupational History   • Not on file.     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 1 Inhaler 2   • topiramate (TOPAMAX) 50 MG tablet Take 0.5 Tabs by mouth every bedtime. 45 Tab 3   • phentermine (ADIPEX-P) 37.5 MG tablet Take 1 Tab by mouth every morning before breakfast for 90 days. 90 Tab 0   • FLUoxetine (PROZAC) 20 MG Cap Take 1 Cap by mouth every day. 90 Cap 3   • metoprolol SR (TOPROL XL) 50 MG TABLET SR 24 HR TAKE ONE TABLET BY MOUTH ONCE DAILY 90 Tab 4   • omeprazole (PRILOSEC) 40 MG delayed-release capsule TAKE ONE CAPSULE BY MOUTH ONCE  DAILY 90 Cap 4   • spironolactone (ALDACTONE) 50 MG Tab TAKE ONE TABLET BY MOUTH ONCE DAILY 90 Tab 4   • nystatin (MYCOSTATIN) 610631 UNIT/GM Ointment APPLY  OINTMENT TOPICALLY TO AFFECTED AREA TWICE DAILY 1 Tube 3   • metronidazole (METROCREAM) 0.75 % cream Apply 1 Application to affected area(s) 2 times a day. 45 g 3   • [DISCONTINUED] oseltamivir (TAMIFLU) 75 MG Cap Take 1 Cap by mouth 2 times a day. (Patient not taking: Reported on 3/21/2019) 10 Cap 0   • [DISCONTINUED] spironolactone (ALDACTONE) 25 MG Tab        No facility-administered encounter medications on file as of 3/21/2019.      Review of Systems   Constitutional: Negative for chills and fever.   HENT: Negative for hearing loss and tinnitus.    Eyes: Negative for blurred vision and double vision.   Respiratory: Positive for shortness of breath. Negative for hemoptysis, sputum production and wheezing.    Cardiovascular: Negative for chest pain and palpitations.   Gastrointestinal: Negative for heartburn and nausea.   Genitourinary: Negative for dysuria and urgency.   Musculoskeletal: Negative for myalgias and neck pain.   Skin: Negative for itching and rash.   Neurological: Positive for dizziness. Negative for loss of consciousness.   Endo/Heme/Allergies: Does not bruise/bleed easily.   Psychiatric/Behavioral: Negative for depression and memory loss. The patient is not nervous/anxious and does not have insomnia.    All other systems reviewed and are negative.       Objective:   /60 (BP Location: Left arm, Patient Position: Sitting, BP Cuff Size: Adult)   Pulse 98   Ht 1.524 m (5')   Wt 110.2 kg (243 lb)   SpO2 98%   BMI 47.46 kg/m²     Physical Exam   Constitutional: She is oriented to person, place, and time.   obese   HENT:   Head: Normocephalic and atraumatic.   Mouth/Throat: No oropharyngeal exudate.   Eyes: Pupils are equal, round, and reactive to light. EOM are normal. No scleral icterus.   Neck: No JVD present. No thyromegaly present.    Cardiovascular: Normal rate, regular rhythm and intact distal pulses.    No murmur heard.  Pulmonary/Chest: Breath sounds normal. No respiratory distress.   Abdominal: Bowel sounds are normal. She exhibits no distension.   Musculoskeletal: She exhibits no edema or tenderness.   Lymphadenopathy:     She has no cervical adenopathy.   Neurological: She is alert and oriented to person, place, and time. She exhibits normal muscle tone. Coordination normal.   Skin: Skin is warm and dry.   Psychiatric: She has a normal mood and affect. Her behavior is normal.       Assessment:     1. SOB (shortness of breath) on exertion         Medical Decision Making:  Today's Assessment / Status / Plan:     Shortness of breath/dyspnea on exertion  Talked about differential diagnosis which includes flow-limiting coronary disease heart failure sleep apnea.  She thinks she snores  Start with chest x-ray.  We also ordered an EKG  Talked about the differential, order echocardiogram as well as nuclear perfusion imaging study to rule out concerning cardiac disease  If these are low risk we will order a sleep study, we talked about pulse oximetry and checking a BNP.  She is on a diuretic    We also talked at length about access to care  If she has more symptoms or escalating problems she will go to the emergency room.  She will think about it    RTC 1 month sooner with concerns

## 2019-04-12 NOTE — TELEPHONE ENCOUNTER
EC-ECHOCARDIOGRAM COMPLETE W/O CONT    Notes recorded by Margaret Pineda M.D. on 4/12/2019 at 9:17 AM PDT  Good news - strong heart function - elevated pressures suggest ANITA - can we refer for sleep study?     Called pt, discussed Echo per Dr Pineda and her recommendations, pt agreed and verbalizes udnerstanding    Referral to Sleep Study initiated

## 2019-04-19 NOTE — ASSESSMENT & PLAN NOTE
This is a health problem that continues to go on where if the patient exerts herself she becomes short of breath.  She had a cardiology work-up with Dr. Pineda who states that her heart is fine but believes she has sleep apnea which seems reasonable and thinking about how her symptomatology is.  They have put in a referral for her to go have a sleep study so we will await that report and go from there.

## 2019-04-19 NOTE — ASSESSMENT & PLAN NOTE
Pt has gained weight so obviously the Phentermine has not been helpful.  She is going to work on changing her diet and we will recheck in 2 months.

## 2019-04-19 NOTE — ASSESSMENT & PLAN NOTE
This is a chronic health problem for this patient for which she has been working on lifestyle management.  Unfortunately she is recently gained about 3 pounds.  She is going to get serious about weight loss started eating more fruits and vegetables and drinking less Cokes and eating less chips.

## 2019-04-21 NOTE — PROGRESS NOTES
CC:Diagnoses of HTN, goal below 130/80, Gastroesophageal reflux disease without esophagitis, SOB (shortness of breath) on exertion, Pure hyperglyceridemia, and Morbid obesity with BMI of 45.0-49.9, adult ([facility]) were pertinent to this visit.    HISTORY OF PRESENT ILLNESS: Patient is a 63 y.o. female established patient who presents today to follow-up from her consultation with the cardiologist.  She has had a complete work-up they came back negative for cardiac disease which is reassuring.  The recommendation is that we get her tested for obstructive sleep apnea and I spent time today explaining to the patient that could adversely affect all of her other chronic health problems.    Health Maintenance: Completed    SOB (shortness of breath) on exertion  This is a health problem that continues to go on where if the patient exerts herself she becomes short of breath.  She had a cardiology work-up with Dr. Pineda who states that her heart is fine but believes she has sleep apnea which seems reasonable and thinking about how her symptomatology is.  They have put in a referral for her to go have a sleep study so we will await that report and go from there.    Pure hyperglyceridemia  This is a chronic health problem for this patient for which she has been working on lifestyle management.  Unfortunately she is recently gained about 3 pounds.  She is going to get serious about weight loss started eating more fruits and vegetables and drinking less Cokes and eating less chips.    Morbid obesity with BMI of 45.0-49.9, adult (HCC)  Pt has gained weight so obviously the Phentermine has not been helpful.  She is going to work on changing her diet and we will recheck in 2 months.      Patient Active Problem List    Diagnosis Date Noted   • Generalized OA 11/21/2018   • Current moderate episode of major depressive disorder without prior episode (HCC) 10/10/2018   • Prediabetes 07/18/2018   • External hemorrhoid 04/17/2015   • Pure  hyperglyceridemia 12/12/2014   • Vaginal vault prolapse 12/12/2014   • HTN, goal below 130/80 12/12/2014   • Morbid obesity with BMI of 45.0-49.9, adult (HCC) 09/26/2014   • GERD (gastroesophageal reflux disease) 09/26/2014   • SOB (shortness of breath) on exertion 09/26/2014   • Rosacea, acne 09/26/2014      Allergies:Patient has no known allergies.    Current Outpatient Prescriptions   Medication Sig Dispense Refill   • FLUoxetine (PROZAC) 20 MG Cap Take 1 Cap by mouth every day. 90 Cap 3   • spironolactone (ALDACTONE) 50 MG Tab TAKE ONE TABLET BY MOUTH ONCE DAILY 90 Tab 4   • omeprazole (PRILOSEC) 40 MG delayed-release capsule TAKE ONE CAPSULE BY MOUTH ONCE DAILY 90 Cap 4   • metoprolol SR (TOPROL XL) 50 MG TABLET SR 24 HR TAKE ONE TABLET BY MOUTH ONCE DAILY 90 Tab 4   • nystatin (MYCOSTATIN) 700207 UNIT/GM Ointment APPLY  OINTMENT TOPICALLY TO AFFECTED AREA TWICE DAILY 1 Tube 3   • topiramate (TOPAMAX) 25 MG capsule Take 1 Cap by mouth every day. 90 Cap 3   • albuterol 108 (90 Base) MCG/ACT Aero Soln inhalation aerosol 2 PUFFS EVERY 4 HOURS ONLY IF NEEDED FOR COUGH, WHEEZING, OR SHORTNESS OF BREATH. 1 Inhaler 2   • metronidazole (METROCREAM) 0.75 % cream Apply 1 Application to affected area(s) 2 times a day. 45 g 3     No current facility-administered medications for this visit.        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    - HEENT:  Negative for headaches, vision changes, hearing changes, ear pain, ear discharge, rhinorrhea, sinus congestion, sore throat, and neck pain.      - Respiratory: Dyspnea on exertion of any type.      - Cardiovascular: Negative for chest pain, palpitations, orthopnea, and bilateral lower extremity edema.     - Gastrointestinal: Negative for heartburn, nausea, vomiting, abdominal pain, hematochezia, melena, diarrhea, constipation, and greasy/foul-smelling stools.     - Genitourinary: Negative for dysuria, polyuria, hematuria, pyuria, urinary urgency, and urinary incontinence.     - Musculoskeletal: Negative for myalgias, back pain, and joint pain.     - Skin: Negative for rash, itching, cyanotic skin color change.     - Neurological: Negative for dizziness, tingling, tremors, focal sensory deficit, focal weakness and headaches.     - Endo/Heme/Allergies: Does not bruise/bleed easily.     - Psychiatric/Behavioral: Negative for depression, suicidal/homicidal ideation and memory loss.          - NOTE: All other systems reviewed and are negative, except as in HPI.      Exam:    /80 (BP Location: Left arm, Patient Position: Sitting, BP Cuff Size: Adult)   Pulse 91   Temp 36.4 °C (97.6 °F) (Temporal)   Resp 14   Ht 1.524 m (5')   Wt 111.8 kg (246 lb 7.6 oz)   SpO2 90%  Body mass index is 48.14 kg/m².    General:  Well nourished, well developed female in NAD  Head is grossly normal.  Neck: Supple without JVD or bruit. Thyroid is not enlarged.  Pulmonary: Clear to ausculation and percussion.  Normal effort. No rales, ronchi, or wheezing.  Cardiovascular: Regular rate and rhythm without murmur. Carotid and radial pulses are intact and equal bilaterally.  Extremities: no clubbing, cyanosis, or edema.  Patient was seen for 25 minutes face to face of which, 20 minutes was spent counseling regarding cardiology  work-up and why that is reassuring as well as a discussion of obstructive sleep apnea and how that can adversely affect the rest of her health problems..    Please note that this dictation was created using voice recognition software. I have made every reasonable attempt to correct obvious errors, but I expect that there are errors of grammar and possibly content that I did not discover before finalizing the note.    Assessment/Plan:  1. HTN, goal below 130/80  Controlled, continue with current meds and lifestyle.    - spironolactone (ALDACTONE) 50 MG Tab; TAKE ONE TABLET BY MOUTH ONCE DAILY  Dispense: 90 Tab; Refill: 4  - metoprolol SR (TOPROL XL) 50 MG TABLET SR 24 HR; TAKE ONE TABLET BY MOUTH ONCE DAILY  Dispense: 90 Tab; Refill: 4    2. Gastroesophageal reflux disease without esophagitis  Controlled, continue with current meds and lifestyle.    - omeprazole (PRILOSEC) 40 MG delayed-release capsule; TAKE ONE CAPSULE BY MOUTH ONCE DAILY  Dispense: 90 Cap; Refill: 4    3. SOB (shortness of breath) on exertion  Uncontrolled, awaiting sleep apnea studies    4. Pure hyperglyceridemia  Uncontrolled needs new blood tests and to see her back to go over results  - Comp Metabolic Panel; Future  - Lipid Profile; Future  - Patient identified as having weight management issue.  Appropriate orders and counseling given.    5. Morbid obesity with BMI of 45.0-49.9, adult (HCC)  Uncontrolled, patient has actually gained weight despite being on phentermine I do not want to continue this medication because it is not helping her.

## 2019-12-11 NOTE — PROGRESS NOTES
Chief Complaint   Patient presents with   • Shortness of Breath       HISTORY OF PRESENT ILLNESS: Patient is a 64 y.o. female who presents today because she has a 1 to 2-week history of cough and wheezing and shortness of breath.  She has been using her albuterol inhaler but that has not been helping.  She has tried over-the-counter cough and cold medications without improvement as well.  Denies any nausea, vomiting or diarrhea.    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    Current Outpatient Medications Ordered in Epic   Medication Sig Dispense Refill   • albuterol 108 (90 Base) MCG/ACT Aero Soln inhalation aerosol Inhale 2 Puffs by mouth every four hours as needed. 1 Inhaler 0   • methylPREDNISolone (MEDROL DOSEPAK) 4 MG Tablet Therapy Pack Take 1 Tab by mouth See Admin Instructions. 21 Tab 0   • azithromycin (ZITHROMAX) 250 MG Tab Follow package directions 6 Tab 0   • FLUoxetine (PROZAC) 20 MG Cap Take 1 Cap by mouth every day. 90 Cap 3   • spironolactone (ALDACTONE) 50 MG Tab TAKE ONE TABLET BY MOUTH ONCE DAILY 90 Tab 4   • omeprazole (PRILOSEC) 40 MG delayed-release capsule TAKE ONE CAPSULE BY MOUTH ONCE DAILY 90 Cap 4   • metoprolol SR (TOPROL XL) 50 MG TABLET SR 24 HR TAKE ONE TABLET BY MOUTH ONCE DAILY 90 Tab 4   • nystatin (MYCOSTATIN) 818144 UNIT/GM Ointment APPLY  OINTMENT TOPICALLY TO AFFECTED AREA TWICE DAILY 1 Tube 3   • topiramate (TOPAMAX) 25 MG  capsule Take 1 Cap by mouth every day. 90 Cap 3   • albuterol 108 (90 Base) MCG/ACT Aero Soln inhalation aerosol 2 PUFFS EVERY 4 HOURS ONLY IF NEEDED FOR COUGH, WHEEZING, OR SHORTNESS OF BREATH. 1 Inhaler 2   • metronidazole (METROCREAM) 0.75 % cream Apply 1 Application to affected area(s) 2 times a day. 45 g 3     No current [facility]-ordered facility-administered medications on file.        Past Medical History:   Diagnosis Date   • Arrhythmia    • Arthritis    • Asthma    • Cough, persistent 2014   • Current moderate episode of major depressive disorder without prior episode (HCC) 10/10/2018   • Dizziness of unknown cause 2014   • External hemorrhoid 2015   • Fatigue 2014   • Generalized OA 2018   • GERD (gastroesophageal reflux disease) 2014   • Headache(784.0)    • Headache, classical migraine    • HTN, goal below 130/80 2014    Pt with multiple elevated readings at 170/110 and 178/112.  Will try spironolactone first   • Morbid obesity with BMI of 40.0-44.9, adult (formerly [facility]) 2014   • Morbid obesity with BMI of 45.0-49.9, adult (formerly [facility]) 2014    UDS done and consistent:  17    • Prediabetes 2018   • Pure hypercholesterolemia 2014   • Pure hyperglyceridemia 2014   • Rosacea, acne 2014   • SOB (shortness of breath) on exertion 2014   • Vaginal vault prolapse 2014    Pt had a mesh procedure with hysterectomy in , now having problems and wants evaluation but refuses surgery.Wants to know options   • Vitamin D deficiency 3/6/2015    Initial 15 without replacement, now on 4K replacement       Social History     Tobacco Use   • Smoking status: Never Smoker   • Smokeless tobacco: Never Used   Substance Use Topics   • Alcohol use: No     Alcohol/week: 0.0 oz   • Drug use: No       Family Status   Relation Name Status   • Mo   at age 67        stomach cancer   • Fa   at age 74        blood cancer   • Sis   at age 65         glioblastoma   • Bro  Alive        70 in 2014   • Sis  Alive        76 in 2014   • Bro  Alive        66 in 2014   • Bro  Alive        64 in 2014     Family History   Problem Relation Age of Onset   • Cancer Mother         stomach cancer   • Cancer Father         blood cancer   • Diabetes Sister    • Hyperlipidemia Sister    • Hypertension Sister    • Heart Attack Brother    • Heart Disease Brother    • Hypertension Brother    • Heart Attack Brother    • Heart Disease Brother    • Hypertension Brother    • Arthritis Brother         knee replacement       ROS:  Review of Systems   Constitutional: Negative for fever, chills, weight loss and malaise/fatigue.   HENT: Negative for ear pain, nosebleeds, congestion, sore throat and neck pain.    Eyes: Negative for blurred vision.   Respiratory: Positive for cough, sputum production, shortness of breath and wheezing.    Cardiovascular: Negative for chest pain, palpitations, orthopnea and leg swelling.   Gastrointestinal: Negative for heartburn, nausea, vomiting and abdominal pain.   Genitourinary: Negative for dysuria, urgency and frequency.     Exam:  /70 (BP Location: Right arm, Patient Position: Sitting, BP Cuff Size: Large adult)   Pulse (!) 116   Temp 36.4 °C (97.5 °F) (Temporal)   Resp 18   Ht 1.524 m (5')   Wt 119.3 kg (263 lb)   SpO2 95%   General:  Well nourished, well developed female in NAD  Head:Normocephalic, atraumatic  Eyes: PERRLA, EOM within normal limits, no conjunctival injection, no scleral icterus, visual fields and acuity grossly intact.  Ears: Normal shape and symmetry, no tenderness, no discharge. External canals are without any significant edema or erythema. Tympanic membranes are without any inflammation, no effusion. Gross auditory acuity is intact  Nose: Symmetrical without tenderness, no discharge.  Mouth: reasonable hygiene, no erythema exudates or tonsillar enlargement.  Neck: no masses, range of motion within normal limits, no  tracheal deviation. No obvious thyroid enlargement.  Pulmonary: chest is symmetrical with respiration, wheezes and rhonchi bilaterally, not clearing with cough cardiovascular: regular rate and rhythm without murmurs, rubs, or gallops.  Extremities: no clubbing, cyanosis, or edema.    Please note that this dictation was created using voice recognition software. I have made every reasonable attempt to correct obvious errors, but I expect that there are errors of grammar and possibly content that I did not discover before finalizing the note.    Assessment/Plan:  1. Wheezing-associated respiratory infection (WARI)  albuterol 108 (90 Base) MCG/ACT Aero Soln inhalation aerosol    methylPREDNISolone (MEDROL DOSEPAK) 4 MG Tablet Therapy Pack    azithromycin (ZITHROMAX) 250 MG Tab       Followup with primary care in the next 7-10 days if not significantly improving, return to the urgent care or go to the emergency room sooner for any worsening of symptoms.